# Patient Record
Sex: MALE | Race: WHITE
[De-identification: names, ages, dates, MRNs, and addresses within clinical notes are randomized per-mention and may not be internally consistent; named-entity substitution may affect disease eponyms.]

---

## 2019-11-22 ENCOUNTER — HOSPITAL ENCOUNTER (OUTPATIENT)
Dept: HOSPITAL 8 - RAD | Age: 59
Discharge: HOME | End: 2019-11-22
Attending: INTERNAL MEDICINE
Payer: COMMERCIAL

## 2019-11-22 DIAGNOSIS — N40.0: ICD-10-CM

## 2019-11-22 DIAGNOSIS — K40.20: ICD-10-CM

## 2019-11-22 DIAGNOSIS — M85.88: ICD-10-CM

## 2019-11-22 DIAGNOSIS — K59.09: Primary | ICD-10-CM

## 2019-11-22 DIAGNOSIS — K76.0: ICD-10-CM

## 2019-11-22 DIAGNOSIS — R10.32: ICD-10-CM

## 2019-11-22 DIAGNOSIS — I70.0: ICD-10-CM

## 2019-11-22 DIAGNOSIS — K41.90: ICD-10-CM

## 2019-11-22 DIAGNOSIS — K57.30: ICD-10-CM

## 2019-11-22 PROCEDURE — 74177 CT ABD & PELVIS W/CONTRAST: CPT

## 2024-07-22 ENCOUNTER — HOSPITAL ENCOUNTER (EMERGENCY)
Facility: MEDICAL CENTER | Age: 64
End: 2024-07-22
Attending: EMERGENCY MEDICINE
Payer: COMMERCIAL

## 2024-07-22 VITALS
HEART RATE: 80 BPM | TEMPERATURE: 99 F | HEIGHT: 76 IN | OXYGEN SATURATION: 98 % | DIASTOLIC BLOOD PRESSURE: 70 MMHG | BODY MASS INDEX: 24.96 KG/M2 | RESPIRATION RATE: 16 BRPM | WEIGHT: 205 LBS | SYSTOLIC BLOOD PRESSURE: 138 MMHG

## 2024-07-22 DIAGNOSIS — S91.312A LACERATION OF DORSUM OF FOOT, LEFT, INITIAL ENCOUNTER: ICD-10-CM

## 2024-07-22 PROCEDURE — 700101 HCHG RX REV CODE 250: Performed by: EMERGENCY MEDICINE

## 2024-07-22 PROCEDURE — 90715 TDAP VACCINE 7 YRS/> IM: CPT | Performed by: EMERGENCY MEDICINE

## 2024-07-22 PROCEDURE — 304217 HCHG IRRIGATION SYSTEM

## 2024-07-22 PROCEDURE — 90471 IMMUNIZATION ADMIN: CPT

## 2024-07-22 PROCEDURE — 99284 EMERGENCY DEPT VISIT MOD MDM: CPT

## 2024-07-22 PROCEDURE — 51798 US URINE CAPACITY MEASURE: CPT

## 2024-07-22 PROCEDURE — 303747 HCHG EXTRA SUTURE

## 2024-07-22 PROCEDURE — 700111 HCHG RX REV CODE 636 W/ 250 OVERRIDE (IP): Performed by: EMERGENCY MEDICINE

## 2024-07-22 PROCEDURE — 304999 HCHG REPAIR-SIMPLE/INTERMED LEVEL 1

## 2024-07-22 RX ADMIN — LIDOCAINE HYDROCHLORIDE 10 ML: 10; .005 INJECTION, SOLUTION EPIDURAL; INFILTRATION; INTRACAUDAL; PERINEURAL at 14:15

## 2024-07-22 RX ADMIN — CLOSTRIDIUM TETANI TOXOID ANTIGEN (FORMALDEHYDE INACTIVATED), CORYNEBACTERIUM DIPHTHERIAE TOXOID ANTIGEN (FORMALDEHYDE INACTIVATED), BORDETELLA PERTUSSIS TOXOID ANTIGEN (GLUTARALDEHYDE INACTIVATED), BORDETELLA PERTUSSIS FILAMENTOUS HEMAGGLUTININ ANTIGEN (FORMALDEHYDE INACTIVATED), BORDETELLA PERTUSSIS PERTACTIN ANTIGEN, AND BORDETELLA PERTUSSIS FIMBRIAE 2/3 ANTIGEN 0.5 ML: 5; 2; 2.5; 5; 3; 5 INJECTION, SUSPENSION INTRAMUSCULAR at 14:45

## 2024-08-02 ENCOUNTER — OFFICE VISIT (OUTPATIENT)
Dept: URGENT CARE | Facility: PHYSICIAN GROUP | Age: 64
End: 2024-08-02
Payer: COMMERCIAL

## 2024-08-02 VITALS
TEMPERATURE: 98.7 F | DIASTOLIC BLOOD PRESSURE: 60 MMHG | HEART RATE: 74 BPM | HEIGHT: 76 IN | WEIGHT: 204 LBS | RESPIRATION RATE: 16 BRPM | OXYGEN SATURATION: 94 % | SYSTOLIC BLOOD PRESSURE: 100 MMHG | BODY MASS INDEX: 24.84 KG/M2

## 2024-08-02 DIAGNOSIS — L08.9 INFECTED LACERATION: ICD-10-CM

## 2024-08-02 DIAGNOSIS — T14.8XXA INFECTED LACERATION: ICD-10-CM

## 2024-08-02 DIAGNOSIS — Z48.02 ENCOUNTER FOR REMOVAL OF SUTURES: ICD-10-CM

## 2024-08-02 PROCEDURE — 15853 REMOVAL SUTR/STAPL XREQ ANES: CPT

## 2024-08-02 PROCEDURE — 99203 OFFICE O/P NEW LOW 30 MIN: CPT

## 2024-08-02 PROCEDURE — 3078F DIAST BP <80 MM HG: CPT

## 2024-08-02 PROCEDURE — 3074F SYST BP LT 130 MM HG: CPT

## 2024-08-02 PROCEDURE — 1125F AMNT PAIN NOTED PAIN PRSNT: CPT

## 2024-08-02 RX ORDER — DOXYCYCLINE HYCLATE 100 MG
100 TABLET ORAL 2 TIMES DAILY
Qty: 14 TABLET | Refills: 0 | Status: SHIPPED | OUTPATIENT
Start: 2024-08-02 | End: 2024-08-09

## 2024-08-02 ASSESSMENT — PAIN SCALES - GENERAL: PAINLEVEL: 2=MINIMAL-SLIGHT

## 2024-08-02 ASSESSMENT — ENCOUNTER SYMPTOMS: FEVER: 0

## 2024-08-02 NOTE — PROGRESS NOTES
"Subjective:     CHIEF COMPLAINT  Chief Complaint   Patient presents with    Suture / Staple Removal     Staples got placed 11 days ago        HPI  Oscar Irizarry is a very pleasant 64 y.o. male who presents for staple removal to laceration on his left foot.  He had 15 staples placed on 7/22/2024 in the emergency department.  He reports that since then he has noticed increased redness and drainage from his laceration site.  He reports difficulty washing his laceration site secondary to mobility.  He has otherwise been feeling well and has not had any fevers.      REVIEW OF SYSTEMS  Review of Systems   Constitutional:  Negative for fever.       PAST MEDICAL HISTORY  There are no problems to display for this patient.      SURGICAL HISTORY  patient denies any surgical history    ALLERGIES  No Known Allergies    CURRENT MEDICATIONS  Home Medications       Reviewed by Tonya Daugherty P.A.-C. (Physician Assistant) on 08/02/24 at 1326  Med List Status: <None>     Medication Last Dose Status        Patient Juan Manuel Taking any Medications                           SOCIAL HISTORY  Social History     Tobacco Use    Smoking status: Never    Smokeless tobacco: Never   Vaping Use    Vaping status: Not on file   Substance and Sexual Activity    Alcohol use: Not Currently    Drug use: Not Currently    Sexual activity: Not Currently       FAMILY HISTORY  History reviewed. No pertinent family history.       Objective:     VITAL SIGNS: /60 (BP Location: Right arm, Patient Position: Sitting, BP Cuff Size: Adult)   Pulse 74   Temp 37.1 °C (98.7 °F) (Temporal)   Resp (!) 1   Ht 1.93 m (6' 4\")   Wt 92.5 kg (204 lb)   SpO2 94%   BMI 24.83 kg/m²     PHYSICAL EXAM  Physical Exam  Vitals reviewed.   Constitutional:       General: He is not in acute distress.     Appearance: Normal appearance. He is not ill-appearing or toxic-appearing.   HENT:      Head: Normocephalic and atraumatic.      Mouth/Throat:      Mouth: Mucous " membranes are moist.   Eyes:      Conjunctiva/sclera: Conjunctivae normal.      Pupils: Pupils are equal, round, and reactive to light.   Pulmonary:      Effort: Pulmonary effort is normal. No respiratory distress.   Musculoskeletal:        Feet:    Feet:      Comments: Laceration to lateral surface of left foot and heel with surrounding erythema and purulent/serosanguineous drainage present.  Area is nontender to palpation.  15 staples in place.  5 staples were able to be removed.  Remainder of wound without full healing completed.  Skin:     General: Skin is warm and dry.   Neurological:      General: No focal deficit present.      Mental Status: He is alert and oriented to person, place, and time.   Psychiatric:         Mood and Affect: Mood normal.       Suture Removal    Date/Time: 8/2/2024 1:47 PM    Performed by: Tonya Daugherty P.A.-C.  Authorized by: Tonya Daugherty P.A.-C.  Body area: lower extremity  Location details: left foot  Wound Appearance: red, warm, draining, moist and purulent  Staples Removed: 5  Post-removal: dressing applied  Patient tolerance: patient tolerated the procedure well with no immediate complications  Comments: 15 staples in place.  5 staples able to be removed.  Remainder of wound without appropriate approximation of wound edges and incomplete healing.           Assessment/Plan:     1. Infected laceration  - doxycycline (VIBRAMYCIN) 100 MG Tab; Take 1 Tablet by mouth 2 times a day for 7 days.  Dispense: 14 Tablet; Refill: 0    2. Encounter for removal of sutures    Other orders  - Suture Removal  -Wash area with warm soapy water  -Tylenol over-the-counter as needed for discomfort  -Return to clinic in 3 to 4 days for removal of remaining tendon staples  -Return to clinic sooner if symptoms worsen or fail to resolve    MDM/Comments:  Patient has stable vital signs and is non-toxic appearing.  Patient initiated on doxycycline for treatment of an infected laceration.  5  staples were able to be removed.  Remaining 10 staples were left in place due to incomplete healing likely secondary to infection delaying healing.  Patient has been advised to return to the clinic in 3 to 4 days for removal of remaining staples.  Discussed wound care instructions with patient including washing site with warm soapy water or asking his wife to help him wash the site if mobility is an issue.  Patient is not displaying any systemic symptoms and otherwise appears well at this time.  Discussed supportive care with keeping the wound site covered while walking around, rest, Tylenol as needed. Patient demonstrated understanding of treatment plan at this time and will RTC in 3-4 days for removal of remaining 10 staples.     Differential diagnosis, natural history, supportive care, and indications for immediate follow-up discussed. All questions answered. Patient agrees with the plan of care.    Follow-up as needed if symptoms worsen or fail to improve to PCP, Urgent care or Emergency Room.    I have personally reviewed prior external notes and test results pertinent to today's visit.  I have independently reviewed and interpreted all diagnostics ordered during this urgent care acute visit.   Discussed management options (risks,benefits, and alternatives to treatment). Pt expresses understanding and the treatment plan was agreed upon. Questions were encouraged and answered to pt's satisfaction.    Please note that this dictation was created using voice recognition software. I have made a reasonable attempt to correct obvious errors, but I expect that there are errors of grammar and possibly content that I did not discover before finalizing the note.

## 2024-08-06 ENCOUNTER — OFFICE VISIT (OUTPATIENT)
Dept: URGENT CARE | Facility: PHYSICIAN GROUP | Age: 64
End: 2024-08-06
Payer: COMMERCIAL

## 2024-08-06 VITALS
WEIGHT: 205 LBS | SYSTOLIC BLOOD PRESSURE: 102 MMHG | RESPIRATION RATE: 16 BRPM | HEIGHT: 76 IN | OXYGEN SATURATION: 96 % | TEMPERATURE: 97.1 F | BODY MASS INDEX: 24.96 KG/M2 | HEART RATE: 78 BPM | DIASTOLIC BLOOD PRESSURE: 60 MMHG

## 2024-08-06 DIAGNOSIS — Z48.02 ENCOUNTER FOR REMOVAL OF SUTURES: ICD-10-CM

## 2024-08-06 DIAGNOSIS — S91.312A LACERATION OF LEFT FOOT, INITIAL ENCOUNTER: ICD-10-CM

## 2024-08-06 PROCEDURE — 99212 OFFICE O/P EST SF 10 MIN: CPT | Performed by: NURSE PRACTITIONER

## 2024-08-06 PROCEDURE — 15853 REMOVAL SUTR/STAPL XREQ ANES: CPT | Performed by: NURSE PRACTITIONER

## 2024-08-06 PROCEDURE — 3074F SYST BP LT 130 MM HG: CPT | Performed by: NURSE PRACTITIONER

## 2024-08-06 PROCEDURE — 3078F DIAST BP <80 MM HG: CPT | Performed by: NURSE PRACTITIONER

## 2024-08-06 ASSESSMENT — ENCOUNTER SYMPTOMS
FEVER: 0
CHILLS: 0
SENSORY CHANGE: 0
TINGLING: 0
MYALGIAS: 0

## 2024-08-06 NOTE — PROGRESS NOTES
"Subjective     Oscar Irizarry is a 64 y.o. male who presents with Suture / Staple Removal            Suture / Staple Removal    Oscar has come into urgent care for staple removal.  Seen in the emergency room July 22 of this year for laceration repair to left foot.  15 staples were placed at that time.  Was seen in urgent care 4 days ago due to increased redness and drainage from site with staple removal.  He was placed on antibiotics and 5 staples were removed at that time.  Has been taking antibiotics with no problems.  No drainage from site.    PMH:  has no past medical history on file.  MEDS:   Current Outpatient Medications:     doxycycline (VIBRAMYCIN) 100 MG Tab, Take 1 Tablet by mouth 2 times a day for 7 days., Disp: 14 Tablet, Rfl: 0  ALLERGIES: No Known Allergies  SURGHX: No past surgical history on file.  SOCHX:  reports that he has never smoked. He has never used smokeless tobacco. He reports that he does not currently use alcohol. He reports that he does not currently use drugs.  FH: Family history was reviewed, no pertinent findings to report    Review of Systems   Constitutional:  Negative for chills, fever and malaise/fatigue.   Cardiovascular:  Negative for leg swelling.   Musculoskeletal:  Negative for joint pain and myalgias.   Skin:  Negative for itching and rash.        Staple removal from left foot.   Neurological:  Negative for tingling and sensory change.   All other systems reviewed and are negative.             Objective     /60 (BP Location: Left arm, Patient Position: Sitting, BP Cuff Size: Adult)   Pulse 78   Temp 36.2 °C (97.1 °F) (Temporal)   Resp 16   Ht 1.93 m (6' 4\")   Wt 93 kg (205 lb) Comment: per pt  SpO2 96%   BMI 24.95 kg/m²      Physical Exam  Vitals reviewed.   Constitutional:       General: He is awake. He is not in acute distress.     Appearance: Normal appearance. He is not ill-appearing, toxic-appearing or diaphoretic.   Cardiovascular:      Rate and " Rhythm: Normal rate.   Pulmonary:      Effort: Pulmonary effort is normal.   Musculoskeletal:      Left foot: Normal range of motion. No deformity, bunion, Charcot foot, foot drop or prominent metatarsal heads.        Feet:    Feet:      Left foot:      Skin integrity: No skin breakdown, erythema or warmth.   Skin:     General: Skin is warm and dry.      Findings: Laceration present. No erythema.      Comments: Ten intact staples to lateral aspect of left heel.  No erythema, swelling or drainage from site.  Removed remaining staples from foot.  Skin has granulated with healing but no full approximation after removal.  I have applied Dermabond as well as 1/2 inch Steri-Strips to site.  Medical assistant applied Telfa and Tegaderm bandage.   Neurological:      Mental Status: He is alert.   Psychiatric:         Behavior: Behavior is cooperative.                             Assessment & Plan        1. Encounter for removal of sutures      2. Laceration of left foot, initial encounter      -May use over the counter ibuprofen or Tylenol as needed for discomfort  -Keep bandage clean and dry, remove and change daily, if soiled or wet remove and replace  -Mild soap and water, do not scrub, pat dry as needed-avoid soap on Steri-Strips as this may cause them to prematurely come off  -Avoid use of triple antibiotic ointment with Steri-Strips  -May use ice for any swelling or discomfort  -Monitor for increased swelling, redness and increased heat to site, discharge, fever, red streaking- need re-evaluation in urgent care  Return as needed

## 2024-11-22 ENCOUNTER — HOSPITAL ENCOUNTER (OUTPATIENT)
Dept: CARDIOLOGY | Facility: MEDICAL CENTER | Age: 64
End: 2024-11-22
Attending: STUDENT IN AN ORGANIZED HEALTH CARE EDUCATION/TRAINING PROGRAM
Payer: COMMERCIAL

## 2024-11-22 ENCOUNTER — HOSPITAL ENCOUNTER (OUTPATIENT)
Dept: LAB | Facility: MEDICAL CENTER | Age: 64
End: 2024-11-22
Attending: STUDENT IN AN ORGANIZED HEALTH CARE EDUCATION/TRAINING PROGRAM
Payer: COMMERCIAL

## 2024-11-22 DIAGNOSIS — R29.90 STROKE-LIKE SYMPTOMS: ICD-10-CM

## 2024-11-22 LAB
ALBUMIN SERPL BCP-MCNC: 4.4 G/DL (ref 3.2–4.9)
ALBUMIN/GLOB SERPL: 1.8 G/DL
ALP SERPL-CCNC: 70 U/L (ref 30–99)
ALT SERPL-CCNC: 15 U/L (ref 2–50)
ANION GAP SERPL CALC-SCNC: 14 MMOL/L (ref 7–16)
AST SERPL-CCNC: 16 U/L (ref 12–45)
BASOPHILS # BLD AUTO: 0.6 % (ref 0–1.8)
BASOPHILS # BLD: 0.04 K/UL (ref 0–0.12)
BILIRUB SERPL-MCNC: 0.4 MG/DL (ref 0.1–1.5)
BUN SERPL-MCNC: 17 MG/DL (ref 8–22)
CALCIUM ALBUM COR SERPL-MCNC: 8.8 MG/DL (ref 8.5–10.5)
CALCIUM SERPL-MCNC: 9.1 MG/DL (ref 8.5–10.5)
CHLORIDE SERPL-SCNC: 104 MMOL/L (ref 96–112)
CHOLEST SERPL-MCNC: 216 MG/DL (ref 100–199)
CO2 SERPL-SCNC: 23 MMOL/L (ref 20–33)
CREAT SERPL-MCNC: 0.83 MG/DL (ref 0.5–1.4)
EOSINOPHIL # BLD AUTO: 0.07 K/UL (ref 0–0.51)
EOSINOPHIL NFR BLD: 1.1 % (ref 0–6.9)
ERYTHROCYTE [DISTWIDTH] IN BLOOD BY AUTOMATED COUNT: 48.5 FL (ref 35.9–50)
EST. AVERAGE GLUCOSE BLD GHB EST-MCNC: 105 MG/DL
FOLATE SERPL-MCNC: 14.7 NG/ML
GFR SERPLBLD CREATININE-BSD FMLA CKD-EPI: 96 ML/MIN/1.73 M 2
GLOBULIN SER CALC-MCNC: 2.4 G/DL (ref 1.9–3.5)
GLUCOSE SERPL-MCNC: 117 MG/DL (ref 65–99)
HBA1C MFR BLD: 5.3 % (ref 4–5.6)
HCT VFR BLD AUTO: 39.9 % (ref 42–52)
HDLC SERPL-MCNC: 53 MG/DL
HGB BLD-MCNC: 13.1 G/DL (ref 14–18)
IMM GRANULOCYTES # BLD AUTO: 0.02 K/UL (ref 0–0.11)
IMM GRANULOCYTES NFR BLD AUTO: 0.3 % (ref 0–0.9)
LDLC SERPL CALC-MCNC: 142 MG/DL
LYMPHOCYTES # BLD AUTO: 1.43 K/UL (ref 1–4.8)
LYMPHOCYTES NFR BLD: 22 % (ref 22–41)
MCH RBC QN AUTO: 32 PG (ref 27–33)
MCHC RBC AUTO-ENTMCNC: 32.8 G/DL (ref 32.3–36.5)
MCV RBC AUTO: 97.6 FL (ref 81.4–97.8)
MONOCYTES # BLD AUTO: 0.35 K/UL (ref 0–0.85)
MONOCYTES NFR BLD AUTO: 5.4 % (ref 0–13.4)
NEUTROPHILS # BLD AUTO: 4.59 K/UL (ref 1.82–7.42)
NEUTROPHILS NFR BLD: 70.6 % (ref 44–72)
NRBC # BLD AUTO: 0 K/UL
NRBC BLD-RTO: 0 /100 WBC (ref 0–0.2)
PLATELET # BLD AUTO: 259 K/UL (ref 164–446)
PMV BLD AUTO: 9.9 FL (ref 9–12.9)
POTASSIUM SERPL-SCNC: 4 MMOL/L (ref 3.6–5.5)
PROT SERPL-MCNC: 6.8 G/DL (ref 6–8.2)
RBC # BLD AUTO: 4.09 M/UL (ref 4.7–6.1)
SODIUM SERPL-SCNC: 141 MMOL/L (ref 135–145)
TRIGL SERPL-MCNC: 104 MG/DL (ref 0–149)
TSH SERPL DL<=0.005 MIU/L-ACNC: 1.34 UIU/ML (ref 0.38–5.33)
VIT B12 SERPL-MCNC: 521 PG/ML (ref 211–911)
WBC # BLD AUTO: 6.5 K/UL (ref 4.8–10.8)

## 2024-11-22 PROCEDURE — 84443 ASSAY THYROID STIM HORMONE: CPT

## 2024-11-22 PROCEDURE — 82607 VITAMIN B-12: CPT

## 2024-11-22 PROCEDURE — 80053 COMPREHEN METABOLIC PANEL: CPT

## 2024-11-22 PROCEDURE — 80061 LIPID PANEL: CPT

## 2024-11-22 PROCEDURE — 36415 COLL VENOUS BLD VENIPUNCTURE: CPT

## 2024-11-22 PROCEDURE — 83036 HEMOGLOBIN GLYCOSYLATED A1C: CPT

## 2024-11-22 PROCEDURE — 93306 TTE W/DOPPLER COMPLETE: CPT

## 2024-11-22 PROCEDURE — 82746 ASSAY OF FOLIC ACID SERUM: CPT

## 2024-11-22 PROCEDURE — 85025 COMPLETE CBC W/AUTO DIFF WBC: CPT

## 2024-11-23 LAB
LV EJECT FRACT  99904: 65
LV EJECT FRACT MOD 2C 99903: 68.75
LV EJECT FRACT MOD 4C 99902: 60.29
LV EJECT FRACT MOD BP 99901: 65.03

## 2024-11-23 PROCEDURE — 93306 TTE W/DOPPLER COMPLETE: CPT | Mod: 26 | Performed by: INTERNAL MEDICINE

## 2024-12-07 ENCOUNTER — HOSPITAL ENCOUNTER (OUTPATIENT)
Dept: RADIOLOGY | Facility: MEDICAL CENTER | Age: 64
End: 2024-12-07
Attending: STUDENT IN AN ORGANIZED HEALTH CARE EDUCATION/TRAINING PROGRAM
Payer: COMMERCIAL

## 2024-12-07 DIAGNOSIS — H53.122 TRANSIENT VISUAL LOSS OF LEFT EYE: ICD-10-CM

## 2024-12-07 PROCEDURE — 700117 HCHG RX CONTRAST REV CODE 255: Mod: JZ | Performed by: STUDENT IN AN ORGANIZED HEALTH CARE EDUCATION/TRAINING PROGRAM

## 2024-12-07 PROCEDURE — A9579 GAD-BASE MR CONTRAST NOS,1ML: HCPCS | Mod: JZ | Performed by: STUDENT IN AN ORGANIZED HEALTH CARE EDUCATION/TRAINING PROGRAM

## 2024-12-07 PROCEDURE — 70553 MRI BRAIN STEM W/O & W/DYE: CPT

## 2024-12-07 RX ADMIN — GADOTERIDOL 19 ML: 279.3 INJECTION, SOLUTION INTRAVENOUS at 15:09

## 2024-12-25 ENCOUNTER — HOSPITAL ENCOUNTER (OUTPATIENT)
Facility: MEDICAL CENTER | Age: 64
End: 2024-12-29
Attending: EMERGENCY MEDICINE | Admitting: INTERNAL MEDICINE
Payer: MEDICARE

## 2024-12-25 ENCOUNTER — APPOINTMENT (OUTPATIENT)
Dept: RADIOLOGY | Facility: MEDICAL CENTER | Age: 64
End: 2024-12-25
Attending: EMERGENCY MEDICINE
Payer: MEDICARE

## 2024-12-25 DIAGNOSIS — H54.40 BLINDNESS OF LEFT EYE, UNSPECIFIED RIGHT EYE VISUAL IMPAIRMENT CATEGORY: ICD-10-CM

## 2024-12-25 DIAGNOSIS — K59.00 CONSTIPATION, UNSPECIFIED CONSTIPATION TYPE: ICD-10-CM

## 2024-12-25 DIAGNOSIS — F80.9 SPEECH AND LANGUAGE DEFICITS: ICD-10-CM

## 2024-12-25 DIAGNOSIS — R29.898 WEAKNESS OF BOTH LOWER EXTREMITIES: ICD-10-CM

## 2024-12-25 DIAGNOSIS — R27.0 ATAXIA: ICD-10-CM

## 2024-12-25 DIAGNOSIS — H35.3220 EXUDATIVE AGE-RELATED MACULAR DEGENERATION OF LEFT EYE, UNSPECIFIED STAGE (HCC): ICD-10-CM

## 2024-12-25 DIAGNOSIS — Z78.9 UNABLE TO CARE FOR SELF: ICD-10-CM

## 2024-12-25 PROBLEM — R74.01 ELEVATED SGOT (AST): Status: ACTIVE | Noted: 2024-12-25

## 2024-12-25 LAB
ALBUMIN SERPL BCP-MCNC: 4.2 G/DL (ref 3.2–4.9)
ALBUMIN/GLOB SERPL: 1.6 G/DL
ALP SERPL-CCNC: 99 U/L (ref 30–99)
ALT SERPL-CCNC: 32 U/L (ref 2–50)
ANION GAP SERPL CALC-SCNC: 13 MMOL/L (ref 7–16)
APPEARANCE UR: CLEAR
AST SERPL-CCNC: 68 U/L (ref 12–45)
BACTERIA #/AREA URNS HPF: ABNORMAL /HPF
BASOPHILS # BLD AUTO: 0.7 % (ref 0–1.8)
BASOPHILS # BLD: 0.05 K/UL (ref 0–0.12)
BILIRUB SERPL-MCNC: 0.5 MG/DL (ref 0.1–1.5)
BILIRUB UR QL STRIP.AUTO: NEGATIVE
BUN SERPL-MCNC: 17 MG/DL (ref 8–22)
CALCIUM ALBUM COR SERPL-MCNC: 9.1 MG/DL (ref 8.5–10.5)
CALCIUM SERPL-MCNC: 9.3 MG/DL (ref 8.5–10.5)
CASTS URNS QL MICRO: ABNORMAL /LPF (ref 0–2)
CHLORIDE SERPL-SCNC: 103 MMOL/L (ref 96–112)
CK SERPL-CCNC: 904 U/L (ref 0–154)
CO2 SERPL-SCNC: 21 MMOL/L (ref 20–33)
COLOR UR: ABNORMAL
CREAT SERPL-MCNC: 1.01 MG/DL (ref 0.5–1.4)
DEPRECATED # ENTITIES SPRM: PRESENT /HPF
EOSINOPHIL # BLD AUTO: 0.1 K/UL (ref 0–0.51)
EOSINOPHIL NFR BLD: 1.4 % (ref 0–6.9)
EPITHELIAL CELLS 1715: ABNORMAL /HPF (ref 0–5)
ERYTHROCYTE [DISTWIDTH] IN BLOOD BY AUTOMATED COUNT: 44.8 FL (ref 35.9–50)
GFR SERPLBLD CREATININE-BSD FMLA CKD-EPI: 82 ML/MIN/1.73 M 2
GLOBULIN SER CALC-MCNC: 2.7 G/DL (ref 1.9–3.5)
GLUCOSE SERPL-MCNC: 99 MG/DL (ref 65–99)
GLUCOSE UR STRIP.AUTO-MCNC: NEGATIVE MG/DL
GRANULAR CASTS  1716G: PRESENT /LPF
HCT VFR BLD AUTO: 36.4 % (ref 42–52)
HGB BLD-MCNC: 12.6 G/DL (ref 14–18)
HYALINE CAST   1831: PRESENT /LPF
IMM GRANULOCYTES # BLD AUTO: 0.01 K/UL (ref 0–0.11)
IMM GRANULOCYTES NFR BLD AUTO: 0.1 % (ref 0–0.9)
KETONES UR STRIP.AUTO-MCNC: 15 MG/DL
LEUKOCYTE ESTERASE UR QL STRIP.AUTO: NEGATIVE
LIPASE SERPL-CCNC: 50 U/L (ref 11–82)
LYMPHOCYTES # BLD AUTO: 1.32 K/UL (ref 1–4.8)
LYMPHOCYTES NFR BLD: 19.1 % (ref 22–41)
MAGNESIUM SERPL-MCNC: 2.2 MG/DL (ref 1.5–2.5)
MCH RBC QN AUTO: 32.9 PG (ref 27–33)
MCHC RBC AUTO-ENTMCNC: 34.6 G/DL (ref 32.3–36.5)
MCV RBC AUTO: 95 FL (ref 81.4–97.8)
MICRO URNS: ABNORMAL
MONOCYTES # BLD AUTO: 0.55 K/UL (ref 0–0.85)
MONOCYTES NFR BLD AUTO: 8 % (ref 0–13.4)
MUCOUS THREADS URNS QL MICRO: PRESENT /HPF
NEUTROPHILS # BLD AUTO: 4.87 K/UL (ref 1.82–7.42)
NEUTROPHILS NFR BLD: 70.7 % (ref 44–72)
NITRITE UR QL STRIP.AUTO: NEGATIVE
NRBC # BLD AUTO: 0 K/UL
NRBC BLD-RTO: 0 /100 WBC (ref 0–0.2)
PH UR STRIP.AUTO: 5 [PH] (ref 5–8)
PHOSPHATE SERPL-MCNC: 2.7 MG/DL (ref 2.5–4.5)
PLATELET # BLD AUTO: 207 K/UL (ref 164–446)
PMV BLD AUTO: 9.9 FL (ref 9–12.9)
POTASSIUM SERPL-SCNC: 3.8 MMOL/L (ref 3.6–5.5)
PROT SERPL-MCNC: 6.9 G/DL (ref 6–8.2)
PROT UR QL STRIP: 30 MG/DL
RBC # BLD AUTO: 3.83 M/UL (ref 4.7–6.1)
RBC # URNS HPF: ABNORMAL /HPF (ref 0–2)
RBC UR QL AUTO: NEGATIVE
SODIUM SERPL-SCNC: 137 MMOL/L (ref 135–145)
SP GR UR STRIP.AUTO: 1.03
TSH SERPL DL<=0.005 MIU/L-ACNC: 1.51 UIU/ML (ref 0.38–5.33)
UROBILINOGEN UR STRIP.AUTO-MCNC: 1 EU/DL
VIT B12 SERPL-MCNC: 624 PG/ML (ref 211–911)
VIT B12 SERPL-MCNC: 626 PG/ML (ref 211–911)
WBC # BLD AUTO: 6.9 K/UL (ref 4.8–10.8)
WBC #/AREA URNS HPF: ABNORMAL /HPF

## 2024-12-25 PROCEDURE — 74018 RADEX ABDOMEN 1 VIEW: CPT

## 2024-12-25 PROCEDURE — 81001 URINALYSIS AUTO W/SCOPE: CPT

## 2024-12-25 PROCEDURE — 83690 ASSAY OF LIPASE: CPT

## 2024-12-25 PROCEDURE — 99221 1ST HOSP IP/OBS SF/LOW 40: CPT | Performed by: INTERNAL MEDICINE

## 2024-12-25 PROCEDURE — A9270 NON-COVERED ITEM OR SERVICE: HCPCS | Performed by: EMERGENCY MEDICINE

## 2024-12-25 PROCEDURE — 82550 ASSAY OF CK (CPK): CPT

## 2024-12-25 PROCEDURE — 82607 VITAMIN B-12: CPT

## 2024-12-25 PROCEDURE — 84100 ASSAY OF PHOSPHORUS: CPT

## 2024-12-25 PROCEDURE — 36415 COLL VENOUS BLD VENIPUNCTURE: CPT

## 2024-12-25 PROCEDURE — 85025 COMPLETE CBC W/AUTO DIFF WBC: CPT

## 2024-12-25 PROCEDURE — G0378 HOSPITAL OBSERVATION PER HR: HCPCS

## 2024-12-25 PROCEDURE — 84425 ASSAY OF VITAMIN B-1: CPT

## 2024-12-25 PROCEDURE — 700102 HCHG RX REV CODE 250 W/ 637 OVERRIDE(OP): Performed by: EMERGENCY MEDICINE

## 2024-12-25 PROCEDURE — A9270 NON-COVERED ITEM OR SERVICE: HCPCS | Performed by: INTERNAL MEDICINE

## 2024-12-25 PROCEDURE — 99285 EMERGENCY DEPT VISIT HI MDM: CPT

## 2024-12-25 PROCEDURE — 84443 ASSAY THYROID STIM HORMONE: CPT

## 2024-12-25 PROCEDURE — 83735 ASSAY OF MAGNESIUM: CPT

## 2024-12-25 PROCEDURE — 700102 HCHG RX REV CODE 250 W/ 637 OVERRIDE(OP): Performed by: INTERNAL MEDICINE

## 2024-12-25 PROCEDURE — 80053 COMPREHEN METABOLIC PANEL: CPT

## 2024-12-25 RX ORDER — ACETAMINOPHEN 325 MG/1
650 TABLET ORAL EVERY 6 HOURS PRN
Status: DISCONTINUED | OUTPATIENT
Start: 2024-12-25 | End: 2024-12-29 | Stop reason: HOSPADM

## 2024-12-25 RX ORDER — BISACODYL 5 MG
10 TABLET, DELAYED RELEASE (ENTERIC COATED) ORAL ONCE
Status: COMPLETED | OUTPATIENT
Start: 2024-12-25 | End: 2024-12-25

## 2024-12-25 RX ORDER — AMOXICILLIN 250 MG
2 CAPSULE ORAL EVERY EVENING
Status: DISCONTINUED | OUTPATIENT
Start: 2024-12-25 | End: 2024-12-26

## 2024-12-25 RX ORDER — ENOXAPARIN SODIUM 100 MG/ML
40 INJECTION SUBCUTANEOUS DAILY
Status: DISCONTINUED | OUTPATIENT
Start: 2024-12-25 | End: 2024-12-29 | Stop reason: HOSPADM

## 2024-12-25 RX ORDER — LABETALOL HYDROCHLORIDE 5 MG/ML
10 INJECTION, SOLUTION INTRAVENOUS EVERY 4 HOURS PRN
Status: DISCONTINUED | OUTPATIENT
Start: 2024-12-25 | End: 2024-12-29 | Stop reason: HOSPADM

## 2024-12-25 RX ORDER — ONDANSETRON 4 MG/1
4 TABLET, ORALLY DISINTEGRATING ORAL EVERY 4 HOURS PRN
Status: DISCONTINUED | OUTPATIENT
Start: 2024-12-25 | End: 2024-12-29 | Stop reason: HOSPADM

## 2024-12-25 RX ORDER — ONDANSETRON 2 MG/ML
4 INJECTION INTRAMUSCULAR; INTRAVENOUS EVERY 4 HOURS PRN
Status: DISCONTINUED | OUTPATIENT
Start: 2024-12-25 | End: 2024-12-29 | Stop reason: HOSPADM

## 2024-12-25 RX ORDER — POLYETHYLENE GLYCOL 3350 17 G/17G
1 POWDER, FOR SOLUTION ORAL
Status: DISCONTINUED | OUTPATIENT
Start: 2024-12-25 | End: 2024-12-26

## 2024-12-25 RX ORDER — POLYETHYLENE GLYCOL 3350 17 G/17G
1 POWDER, FOR SOLUTION ORAL 2 TIMES DAILY
Status: DISCONTINUED | OUTPATIENT
Start: 2024-12-25 | End: 2024-12-26

## 2024-12-25 RX ADMIN — BISACODYL 10 MG: 5 TABLET, COATED ORAL at 15:12

## 2024-12-25 RX ADMIN — MAJOR MAGNESIUM CITRATE ORAL SOLUTION - LEMON 296 ML: 1.75 LIQUID ORAL at 15:12

## 2024-12-25 RX ADMIN — POLYETHYLENE GLYCOL 3350 1 PACKET: 17 POWDER, FOR SOLUTION ORAL at 18:15

## 2024-12-25 SDOH — ECONOMIC STABILITY: TRANSPORTATION INSECURITY
IN THE PAST 12 MONTHS, HAS LACK OF RELIABLE TRANSPORTATION KEPT YOU FROM MEDICAL APPOINTMENTS, MEETINGS, WORK OR FROM GETTING THINGS NEEDED FOR DAILY LIVING?: NO

## 2024-12-25 SDOH — ECONOMIC STABILITY: TRANSPORTATION INSECURITY
IN THE PAST 12 MONTHS, HAS THE LACK OF TRANSPORTATION KEPT YOU FROM MEDICAL APPOINTMENTS OR FROM GETTING MEDICATIONS?: NO

## 2024-12-25 ASSESSMENT — LIFESTYLE VARIABLES
HAVE PEOPLE ANNOYED YOU BY CRITICIZING YOUR DRINKING: NO
HAVE YOU EVER FELT YOU SHOULD CUT DOWN ON YOUR DRINKING: NO
CONSUMPTION TOTAL: NEGATIVE
EVER HAD A DRINK FIRST THING IN THE MORNING TO STEADY YOUR NERVES TO GET RID OF A HANGOVER: NO
SUBSTANCE_ABUSE: 0
ON A TYPICAL DAY WHEN YOU DRINK ALCOHOL HOW MANY DRINKS DO YOU HAVE: 0
TOTAL SCORE: 0
HOW MANY TIMES IN THE PAST YEAR HAVE YOU HAD 5 OR MORE DRINKS IN A DAY: 0
TOTAL SCORE: 0
TOTAL SCORE: 0
EVER FELT BAD OR GUILTY ABOUT YOUR DRINKING: NO
ALCOHOL_USE: NO
AVERAGE NUMBER OF DAYS PER WEEK YOU HAVE A DRINK CONTAINING ALCOHOL: 0

## 2024-12-25 ASSESSMENT — ENCOUNTER SYMPTOMS
NECK PAIN: 0
SPUTUM PRODUCTION: 0
HEMOPTYSIS: 0
DOUBLE VISION: 0
NERVOUS/ANXIOUS: 0
TREMORS: 0
ORTHOPNEA: 0
PALPITATIONS: 0
WEIGHT LOSS: 0
POLYDIPSIA: 0
CHILLS: 0
CONSTIPATION: 1
FALLS: 1
BLURRED VISION: 0
FLANK PAIN: 0
BRUISES/BLEEDS EASILY: 0
BACK PAIN: 0
PHOTOPHOBIA: 0
NAUSEA: 0
HALLUCINATIONS: 0
HEARTBURN: 0
VOMITING: 0
FOCAL WEAKNESS: 0
HEADACHES: 0
SPEECH CHANGE: 0
COUGH: 0
FEVER: 0

## 2024-12-25 ASSESSMENT — COGNITIVE AND FUNCTIONAL STATUS - GENERAL
WALKING IN HOSPITAL ROOM: A LOT
SUGGESTED CMS G CODE MODIFIER DAILY ACTIVITY: CJ
SUGGESTED CMS G CODE MODIFIER MOBILITY: CK
DAILY ACTIVITIY SCORE: 20
DRESSING REGULAR LOWER BODY CLOTHING: A LITTLE
TURNING FROM BACK TO SIDE WHILE IN FLAT BAD: A LITTLE
MOVING FROM LYING ON BACK TO SITTING ON SIDE OF FLAT BED: A LITTLE
MOVING TO AND FROM BED TO CHAIR: A LITTLE
MOBILITY SCORE: 15
HELP NEEDED FOR BATHING: A LITTLE
TOILETING: A LITTLE
STANDING UP FROM CHAIR USING ARMS: A LOT
DRESSING REGULAR UPPER BODY CLOTHING: A LITTLE
CLIMB 3 TO 5 STEPS WITH RAILING: A LOT

## 2024-12-25 ASSESSMENT — SOCIAL DETERMINANTS OF HEALTH (SDOH)
WITHIN THE LAST YEAR, HAVE YOU BEEN KICKED, HIT, SLAPPED, OR OTHERWISE PHYSICALLY HURT BY YOUR PARTNER OR EX-PARTNER?: NO
IN THE PAST 12 MONTHS, HAS THE ELECTRIC, GAS, OIL, OR WATER COMPANY THREATENED TO SHUT OFF SERVICE IN YOUR HOME?: NO
WITHIN THE LAST YEAR, HAVE YOU BEEN AFRAID OF YOUR PARTNER OR EX-PARTNER?: NO
WITHIN THE PAST 12 MONTHS, YOU WORRIED THAT YOUR FOOD WOULD RUN OUT BEFORE YOU GOT THE MONEY TO BUY MORE: NEVER TRUE
WITHIN THE LAST YEAR, HAVE TO BEEN RAPED OR FORCED TO HAVE ANY KIND OF SEXUAL ACTIVITY BY YOUR PARTNER OR EX-PARTNER?: NO
WITHIN THE PAST 12 MONTHS, THE FOOD YOU BOUGHT JUST DIDN'T LAST AND YOU DIDN'T HAVE MONEY TO GET MORE: NEVER TRUE
WITHIN THE LAST YEAR, HAVE YOU BEEN HUMILIATED OR EMOTIONALLY ABUSED IN OTHER WAYS BY YOUR PARTNER OR EX-PARTNER?: NO

## 2024-12-25 ASSESSMENT — PATIENT HEALTH QUESTIONNAIRE - PHQ9
1. LITTLE INTEREST OR PLEASURE IN DOING THINGS: NOT AT ALL
2. FEELING DOWN, DEPRESSED, IRRITABLE, OR HOPELESS: NOT AT ALL
SUM OF ALL RESPONSES TO PHQ9 QUESTIONS 1 AND 2: 0

## 2024-12-25 ASSESSMENT — PAIN DESCRIPTION - PAIN TYPE
TYPE: ACUTE PAIN
TYPE: ACUTE PAIN

## 2024-12-25 ASSESSMENT — FIBROSIS 4 INDEX: FIB4 SCORE: 1.05

## 2024-12-25 NOTE — ED NOTES
Assumed pt care as break RN. SW at bedside. Call light within reach, fall precautions in place, needs met.

## 2024-12-25 NOTE — ED NOTES
Pt brought back to [facility] 25 vis w.c by request. Pt able to transfer from w/c to [facility] by self. Connected to monitor. Safety reviewed call light within reach.

## 2024-12-25 NOTE — ED TRIAGE NOTES
"Chief Complaint   Patient presents with    Constipation     Last BM x5 days ago, has not taken OTC constipation meds, lower abd pain 8/10, can't ambulate d/t weakness, able to urinate, states not diagnosed with any GI issues but constipation has happened before x2 weeks ago        wc to triage with friend for above complaint.     ABD pain protocols ordered. Pt brought to [facility] office for blood draw. UA given. Pt educated of triage process and possible wait times. Pt informed to contact staff if status changes or with any developing concerns, pt returned to lobby.     /82   Pulse 79   Temp 36.4 °C (97.5 °F) (Temporal)   Resp 16   Ht 1.854 m (6' 1\")   Wt 81.6 kg (180 lb)   SpO2 96%   BMI 23.75 kg/m²      "

## 2024-12-25 NOTE — ED NOTES
Pt called back from lobby. States does not want to go back to room yet as he's waiting for his friend. Attempted to explain that we can let pt come back to the room if he comes back. Pt states friend has his wallet and he doesn't want to leave lobby until his friend is back with wallet.

## 2024-12-25 NOTE — ED PROVIDER NOTES
ER Provider Note    Scribed for Dr. Monie Fallon D.O. by Ada Gallegos. 12/25/2024  2:11 PM    Primary Care Provider: None noted    CHIEF COMPLAINT  Chief Complaint   Patient presents with    Constipation     Last BM x5 days ago, has not taken OTC constipation meds, lower abd pain 8/10, can't ambulate d/t weakness, able to urinate, states not diagnosed with any GI issues but constipation has happened before x2 weeks ago     EXTERNAL RECORDS REVIEWED  Outpatient labs & studies MRI brain done on 12/7/24:   1.  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 Associated somewhat hypoplastic right cerebellar hemisphere.   2.  Mild cerebral atrophy.   3.  Mild supratentorial white matter disease most consistent with microvascular ischemic change.   4.  Minimal pontine ischemic gliosis.   5.  No evidence of acute infarction, hemorrhage, or enhancing mass lesion.   6.  Markedly abnormal left globe demonstrating a retinal detachment or choroidal detachment.     HPI/ROS  LIMITATION TO HISTORY   Select: : None    OUTSIDE HISTORIAN(S):  None    Oscar Irizarry is a 64 y.o. male who presents to the ED for constipation onset 5 days ago. Patient reports associated lower abdominal pain rating it an 8/10 in severity. He states he has not taken any OTC to alleviate this. Patient reports 2 years ago, he had a trip and fell backwards. He had an xray which demonstrated left femur fracture, which did not require surgery. After this, he was able to move himself around and weaned himself off crutches. He ended up retiring shortly after this, however notes his weakness returned and progressed. He fell again about 6 months ago. He adds, that he was seen at ED in July for a foot laceration, for which he obtained 15 staples for. 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  Patient reports he takes prune juice for his constipation,  "however this time he has been unable to get to the restroom and has been holding his stool for some time. He reports his neighbor usually helps him and  now he is planning on moving to [facility] to be closer to his daughter. He states he lives with his wife, who is bed ridden and on oxygen. Patient states he has an onset of slurred speech, which he woke up with 6 weeks ago. He had an MRI done on 12/7 and has been following with his PCP. Patient has an appointment with Neurology end of January.    PAST MEDICAL HISTORY  History reviewed. No pertinent past medical history.    SURGICAL HISTORY  History reviewed. No pertinent surgical history.    FAMILY HISTORY  History reviewed. No pertinent family history.    SOCIAL HISTORY   reports that he has never smoked. He has never used smokeless tobacco. He reports current alcohol use. He reports that he does not currently use drugs.    CURRENT MEDICATIONS  No current outpatient medications    ALLERGIES  Patient has no known allergies.    PHYSICAL EXAM  BP (!) 189/84   Pulse 68   Temp 36.4 °C (97.5 °F) (Temporal)   Resp 16   Ht 1.854 m (6' 1\")   Wt 81.6 kg (180 lb)   SpO2 98%   BMI 23.75 kg/m²   Constitutional: Patient is well developed, well nourished.  Mild  HENT: Normocephalic, atraumatic.  Moist oral mucosa, garbled speech    Cardiovascular: Normal heart rate and Regular rhythm. No murmur  Thorax & Lungs: Clear and equal breath sounds with good excursion. No respiratory distress, no rhonchi, wheezing or rales.  Abdomen: Hyperactive sounds normal in all four quadrants. Soft,nontender, no rebound , guarding, palpable masses.  Mild generalized tenderness  Skin: Warm, Dry   Extremities: Peripheral pulses 4/4 No edema, No tenderness    Neurologic: Alert & oriented x 3, Normal motor function, Normal sensory function, No lateralizing or focal deficits noted. lower extremity weakness bilaterally, equal  strength , garbled speech.  Psychiatric: Affect normal, Judgment normal, " Mood normal.     DIAGNOSTIC STUDIES & PROCEDURES  Labs:   Results for orders placed or performed during the hospital encounter of 12/25/24   CBC WITH DIFFERENTIAL    Collection Time: 12/25/24  1:05 PM   Result Value Ref Range    WBC 6.9 4.8 - 10.8 K/uL    RBC 3.83 (L) 4.70 - 6.10 M/uL    Hemoglobin 12.6 (L) 14.0 - 18.0 g/dL    Hematocrit 36.4 (L) 42.0 - 52.0 %    MCV 95.0 81.4 - 97.8 fL    MCH 32.9 27.0 - 33.0 pg    MCHC 34.6 32.3 - 36.5 g/dL    RDW 44.8 35.9 - 50.0 fL    Platelet Count 207 164 - 446 K/uL    MPV 9.9 9.0 - 12.9 fL    Neutrophils-Polys 70.70 44.00 - 72.00 %    Lymphocytes 19.10 (L) 22.00 - 41.00 %    Monocytes 8.00 0.00 - 13.40 %    Eosinophils 1.40 0.00 - 6.90 %    Basophils 0.70 0.00 - 1.80 %    Immature Granulocytes 0.10 0.00 - 0.90 %    Nucleated RBC 0.00 0.00 - 0.20 /100 WBC    Neutrophils (Absolute) 4.87 1.82 - 7.42 K/uL    Lymphs (Absolute) 1.32 1.00 - 4.80 K/uL    Monos (Absolute) 0.55 0.00 - 0.85 K/uL    Eos (Absolute) 0.10 0.00 - 0.51 K/uL    Baso (Absolute) 0.05 0.00 - 0.12 K/uL    Immature Granulocytes (abs) 0.01 0.00 - 0.11 K/uL    NRBC (Absolute) 0.00 K/uL   COMP METABOLIC PANEL    Collection Time: 12/25/24  1:05 PM   Result Value Ref Range    Sodium 137 135 - 145 mmol/L    Potassium 3.8 3.6 - 5.5 mmol/L    Chloride 103 96 - 112 mmol/L    Co2 21 20 - 33 mmol/L    Anion Gap 13.0 7.0 - 16.0    Glucose 99 65 - 99 mg/dL    Bun 17 8 - 22 mg/dL    Creatinine 1.01 0.50 - 1.40 mg/dL    Calcium 9.3 8.5 - 10.5 mg/dL    Correct Calcium 9.1 8.5 - 10.5 mg/dL    AST(SGOT) 68 (H) 12 - 45 U/L    ALT(SGPT) 32 2 - 50 U/L    Alkaline Phosphatase 99 30 - 99 U/L    Total Bilirubin 0.5 0.1 - 1.5 mg/dL    Albumin 4.2 3.2 - 4.9 g/dL    Total Protein 6.9 6.0 - 8.2 g/dL    Globulin 2.7 1.9 - 3.5 g/dL    A-G Ratio 1.6 g/dL   LIPASE    Collection Time: 12/25/24  1:05 PM   Result Value Ref Range    Lipase 50 11 - 82 U/L   ESTIMATED GFR    Collection Time: 12/25/24  1:05 PM   Result Value Ref Range    GFR (CKD-EPI)  82 >60 mL/min/1.73 m 2   URINALYSIS    Collection Time: 12/25/24  2:11 PM    Specimen: Urine   Result Value Ref Range    Color Dark Yellow     Character Clear     Specific Gravity 1.028 <1.035    Ph 5.0 5.0 - 8.0    Glucose Negative Negative mg/dL    Ketones 15 (A) Negative mg/dL    Protein 30 (A) Negative mg/dL    Bilirubin Negative Negative    Urobilinogen, Urine 1.0 <=1.0 EU/dL    Nitrite Negative Negative    Leukocyte Esterase Negative Negative    Occult Blood Negative Negative    Micro Urine Req Microscopic    URINE MICROSCOPIC (W/UA)    Collection Time: 12/25/24  2:11 PM   Result Value Ref Range    WBC 0-2 /hpf    RBC 0-2 0 - 2 /hpf    Bacteria None None /hpf    Epithelial Cells 0-2 0 - 5 /hpf    Mucous Threads Present /hpf    Urine Casts 3-5 (A) 0 - 2 /lpf    Hyaline Cast Present /lpf    Granular Casts Present (A) Absent /lpf    Sperm Present /hpf   All labs reviewed by me.    Radiology:   The attending Emergency Physician has independently interpreted the diagnostic imaging associated with this visit and is awaiting the final reading from the radiologist, which will be displayed below.  Preliminary interpretation is a follows: Constipation, no obstruction.  Radiologist interpretation:  [id number] VIEW   Final Result      Moderate constipation. No evidence of bowel obstruction.         COURSE & MEDICAL DECISION MAKING       INITIAL ASSESSMENT AND PLAN  Care Narrative:       3:37 PM - Patient was seen and evaluated at bedside. Patient presents to the ED for constipation.  After my exam, I discussed with the patient the plan of care, which includes treating the patient with medication for their symptoms, as well as obtaining lab work and imaging for further evaluation. Patient understands and verbalizes agreement to plan of care. Patient will be treated with Dulcolax 10 mg, magnesium citrate solution 296 mL. Ordered DX abdomen, urine microscopic, CBC w diff, CMP, lipase and UA to evaluate.   Differential  diagnoses include but not limited to: constipation, generalized weakness, inability to care for self, speech deficits     Patient was given oral laxatives as well as a soapsuds enema and had good results.  I did laboratories on the patient and reviewed his MRI of his brain.  I spoke with social work because I do not feel the patient can go home on his own, he is unable to ambulate on his legs as they are too weak, he is unable to care for himself and he has a wife that he is currently caring for at home who is bedridden on oxygen.    3:35 PM I discussed the patient's case and the above findings with Maci (Social Work).     4:15 PM - Paged Hospitalist.     4:39 PM - Hospitalist responded. I discussed the patient's case and the above findings with Dr. Kerns (Hospitalist) who agrees to evaluate the patient for hospitalization.                    DISPOSITION AND DISCUSSIONS  I have discussed management of the patient with the following physicians and JUAN A's: Dr. Kerns (Hospitalist)    Discussion of management with other [facility] or appropriate source(s): None     Escalation of care considered, and ultimately not performed: IV fluids.    Barriers to care at this time, including but not limited to:  None .     Decision tools and prescription drugs considered including, but not limited to: Hospital admission..    DISPOSITION:  Patient will be hospitalized by Dr. Kerns in guarded condition.    FINAL IMPRESSION   1. Constipation, unspecified constipation type    2. Speech and language deficits    3. Weakness of both lower extremities    4. Unable to care for self    5. Exudative age-related macular degeneration of left eye, unspecified stage ([facility])    6. Blindness of left eye, unspecified right eye visual impairment category       I, Ada Gallegos (Monserrat), am scribing for, and in the presence of, Monie Fallon D.O..    Electronically signed by: Ada Gallegos (Monserrat), 12/25/2024    Monie ZUNIGA D.O. personally  performed the services described in this documentation, as scribed by Ada Gallegos in my presence, and it is both accurate and complete.    The note accurately reflects work and decisions made by me.  Monie Fallon D.O.  12/25/2024  9:47 PM

## 2024-12-25 NOTE — ED NOTES
Xray at bedside now. Pt swallowed medications without issues.   BSC placed next to pt and pt instructed to pt to press call light once ready to transfer to BSC

## 2024-12-25 NOTE — ED NOTES
Pt had labs drawn out front.   States unable to provide urine sample as he's soiled himself.   Pt assisted to remove pants and get clean brief on. Urinal placed at bedside for sample collection.   Pt states injured his L Leg previously and was unable to get to his restroom. Has been holding his stool for some time and now unable to go.   Safety reviewed, call light within reach. Both side rails up for safety.

## 2024-12-26 PROBLEM — R62.7 ADULT FAILURE TO THRIVE: Status: ACTIVE | Noted: 2024-12-26

## 2024-12-26 LAB
ALBUMIN SERPL BCP-MCNC: 3.8 G/DL (ref 3.2–4.9)
ALBUMIN/GLOB SERPL: 1.7 G/DL
ALP SERPL-CCNC: 89 U/L (ref 30–99)
ALT SERPL-CCNC: 29 U/L (ref 2–50)
ANION GAP SERPL CALC-SCNC: 13 MMOL/L (ref 7–16)
AST SERPL-CCNC: 37 U/L (ref 12–45)
BASOPHILS # BLD AUTO: 0.6 % (ref 0–1.8)
BASOPHILS # BLD: 0.05 K/UL (ref 0–0.12)
BILIRUB SERPL-MCNC: 0.3 MG/DL (ref 0.1–1.5)
BUN SERPL-MCNC: 14 MG/DL (ref 8–22)
CALCIUM ALBUM COR SERPL-MCNC: 8.5 MG/DL (ref 8.5–10.5)
CALCIUM SERPL-MCNC: 8.3 MG/DL (ref 8.5–10.5)
CHLORIDE SERPL-SCNC: 104 MMOL/L (ref 96–112)
CO2 SERPL-SCNC: 22 MMOL/L (ref 20–33)
CREAT SERPL-MCNC: 0.77 MG/DL (ref 0.5–1.4)
EOSINOPHIL # BLD AUTO: 0.17 K/UL (ref 0–0.51)
EOSINOPHIL NFR BLD: 2.2 % (ref 0–6.9)
ERYTHROCYTE [DISTWIDTH] IN BLOOD BY AUTOMATED COUNT: 45.9 FL (ref 35.9–50)
GFR SERPLBLD CREATININE-BSD FMLA CKD-EPI: 98 ML/MIN/1.73 M 2
GLOBULIN SER CALC-MCNC: 2.3 G/DL (ref 1.9–3.5)
GLUCOSE SERPL-MCNC: 114 MG/DL (ref 65–99)
HCT VFR BLD AUTO: 34.4 % (ref 42–52)
HGB BLD-MCNC: 11.6 G/DL (ref 14–18)
IMM GRANULOCYTES # BLD AUTO: 0.01 K/UL (ref 0–0.11)
IMM GRANULOCYTES NFR BLD AUTO: 0.1 % (ref 0–0.9)
LYMPHOCYTES # BLD AUTO: 1.66 K/UL (ref 1–4.8)
LYMPHOCYTES NFR BLD: 21.4 % (ref 22–41)
MCH RBC QN AUTO: 32.8 PG (ref 27–33)
MCHC RBC AUTO-ENTMCNC: 33.7 G/DL (ref 32.3–36.5)
MCV RBC AUTO: 97.2 FL (ref 81.4–97.8)
MONOCYTES # BLD AUTO: 0.56 K/UL (ref 0–0.85)
MONOCYTES NFR BLD AUTO: 7.2 % (ref 0–13.4)
NEUTROPHILS # BLD AUTO: 5.3 K/UL (ref 1.82–7.42)
NEUTROPHILS NFR BLD: 68.5 % (ref 44–72)
NRBC # BLD AUTO: 0 K/UL
NRBC BLD-RTO: 0 /100 WBC (ref 0–0.2)
PLATELET # BLD AUTO: 210 K/UL (ref 164–446)
PMV BLD AUTO: 9.8 FL (ref 9–12.9)
POTASSIUM SERPL-SCNC: 3.7 MMOL/L (ref 3.6–5.5)
PROT SERPL-MCNC: 6.1 G/DL (ref 6–8.2)
RBC # BLD AUTO: 3.54 M/UL (ref 4.7–6.1)
SODIUM SERPL-SCNC: 139 MMOL/L (ref 135–145)
WBC # BLD AUTO: 7.8 K/UL (ref 4.8–10.8)

## 2024-12-26 PROCEDURE — G0378 HOSPITAL OBSERVATION PER HR: HCPCS

## 2024-12-26 PROCEDURE — 99233 SBSQ HOSP IP/OBS HIGH 50: CPT | Performed by: HOSPITALIST

## 2024-12-26 PROCEDURE — 700111 HCHG RX REV CODE 636 W/ 250 OVERRIDE (IP): Mod: JZ | Performed by: INTERNAL MEDICINE

## 2024-12-26 PROCEDURE — 96372 THER/PROPH/DIAG INJ SC/IM: CPT

## 2024-12-26 PROCEDURE — 97166 OT EVAL MOD COMPLEX 45 MIN: CPT

## 2024-12-26 PROCEDURE — 85025 COMPLETE CBC W/AUTO DIFF WBC: CPT

## 2024-12-26 PROCEDURE — 700105 HCHG RX REV CODE 258: Performed by: HOSPITALIST

## 2024-12-26 PROCEDURE — 97163 PT EVAL HIGH COMPLEX 45 MIN: CPT

## 2024-12-26 PROCEDURE — 36415 COLL VENOUS BLD VENIPUNCTURE: CPT

## 2024-12-26 PROCEDURE — 80053 COMPREHEN METABOLIC PANEL: CPT

## 2024-12-26 RX ORDER — POLYETHYLENE GLYCOL 3350 17 G/17G
1 POWDER, FOR SOLUTION ORAL DAILY
Status: DISCONTINUED | OUTPATIENT
Start: 2024-12-27 | End: 2024-12-29 | Stop reason: HOSPADM

## 2024-12-26 RX ORDER — AMOXICILLIN 250 MG
2 CAPSULE ORAL 2 TIMES DAILY
Status: DISCONTINUED | OUTPATIENT
Start: 2024-12-27 | End: 2024-12-29 | Stop reason: HOSPADM

## 2024-12-26 RX ORDER — SODIUM CHLORIDE 9 MG/ML
INJECTION, SOLUTION INTRAVENOUS CONTINUOUS
Status: DISCONTINUED | OUTPATIENT
Start: 2024-12-26 | End: 2024-12-27

## 2024-12-26 RX ORDER — POLYETHYLENE GLYCOL 3350 17 G/17G
1 POWDER, FOR SOLUTION ORAL
Status: DISCONTINUED | OUTPATIENT
Start: 2024-12-26 | End: 2024-12-26

## 2024-12-26 RX ADMIN — SODIUM CHLORIDE: 9 INJECTION, SOLUTION INTRAVENOUS at 19:45

## 2024-12-26 RX ADMIN — ENOXAPARIN SODIUM 40 MG: 100 INJECTION SUBCUTANEOUS at 17:07

## 2024-12-26 ASSESSMENT — COGNITIVE AND FUNCTIONAL STATUS - GENERAL
TOILETING: A LITTLE
HELP NEEDED FOR BATHING: A LITTLE
DRESSING REGULAR LOWER BODY CLOTHING: A LITTLE
PERSONAL GROOMING: A LITTLE
CLIMB 3 TO 5 STEPS WITH RAILING: A LOT
SUGGESTED CMS G CODE MODIFIER MOBILITY: CK
MOVING TO AND FROM BED TO CHAIR: A LITTLE
WALKING IN HOSPITAL ROOM: A LOT
DAILY ACTIVITIY SCORE: 19
DRESSING REGULAR UPPER BODY CLOTHING: A LITTLE
STANDING UP FROM CHAIR USING ARMS: A LITTLE
MOBILITY SCORE: 17
MOVING FROM LYING ON BACK TO SITTING ON SIDE OF FLAT BED: A LITTLE
SUGGESTED CMS G CODE MODIFIER DAILY ACTIVITY: CK

## 2024-12-26 ASSESSMENT — ENCOUNTER SYMPTOMS
WEAKNESS: 1
SPEECH CHANGE: 1
GASTROINTESTINAL NEGATIVE: 1
CONSTITUTIONAL NEGATIVE: 1
MUSCULOSKELETAL NEGATIVE: 1
RESPIRATORY NEGATIVE: 1
PSYCHIATRIC NEGATIVE: 1
EYES NEGATIVE: 1

## 2024-12-26 ASSESSMENT — FIBROSIS 4 INDEX: FIB4 SCORE: 2.16

## 2024-12-26 ASSESSMENT — GAIT ASSESSMENTS
ASSISTIVE DEVICE: FRONT WHEEL WALKER
DEVIATION: INCREASED BASE OF SUPPORT
GAIT LEVEL OF ASSIST: MINIMAL ASSIST

## 2024-12-26 ASSESSMENT — ACTIVITIES OF DAILY LIVING (ADL): TOILETING: INDEPENDENT

## 2024-12-26 ASSESSMENT — PAIN DESCRIPTION - PAIN TYPE
TYPE: ACUTE PAIN

## 2024-12-26 NOTE — CARE PLAN
The patient is Stable - Low risk of patient condition declining or worsening    Shift Goals  Clinical Goals: labs, PT/OT, constipation relief  Patient Goals: go home, do ADLs  Family Goals: ALTON    Problem: Mobility  Goal: Patient's capacity to carry out activities will improve  Description: Target End Date:  12/27/24    1.  Assess for barriers to mobility/activity  2.  Implement activity per interdisciplinary team recommendations  3.  Target activity level identified and patient/family/caregiver aware of goal  4.  Provide assistive devices  5.  Instruct patient/caregiver on proper use of assistive/adaptive devices  6.  Schedule activities and rest periods to decrease effects of fatigue  7.  Encourage mobilization to extent of ability  8.  Maintain proper body alignment  9.  Provide adequate pain management to allow progressive mobilization  Outcome: Progressing     Problem: Bowel Elimination  Goal: Establish and maintain regular bowel function  Description: Target End Date:  12/27/24    1.   Note date of last BM  2.   Educate about diet, fluid intake, medication and activity to promote bowel function  3.   Educate signs and symptoms of constipation and interventions to implement  4.   Pharmacologic bowel management per provider order  5.   Regular toileting schedule  6.   Upright position for toileting  7.   High fiber diet  8.   Encourage hydration  9.   Collaborate with Clinical Dietician  10. Care and maintenance of ostomy if applicable  Outcome: Progressing     Problem: Fall Risk  Goal: Patient will remain free from falls  Description: Target End Date:  12/27/24  1.  Assess for fall risk factors  2.  Implement fall precautions  Outcome: Progressing     Problem: Knowledge Deficit - Standard  Goal: Patient will demonstrate understanding of plan of care, disease process/condition, diagnostic tests and medications  Description: Target End Date:  12/27/24    1.  Patient oriented to unit, equipment, visitation policy  and means for communicating concern  2.  Complete/review Learning Assessment  3.  Assess knowledge level of disease process/condition, treatment plan, diagnostic tests and medications  4.  Explain disease process/condition, treatment plan, diagnostic tests and medications  Outcome: Progressing

## 2024-12-26 NOTE — PROGRESS NOTES
Report given to Misty PEDERSEN and Teresa RN on S6. This RN also called patient's wife Selma and updated her on plan of care. Patient denies pain at this time

## 2024-12-26 NOTE — PROGRESS NOTES
4 Eyes Skin Assessment Completed by SLAVA Danielle and SLAVA Camacho.    Head WDL  Ears WDL  Nose WDL  Mouth WDL  Neck WDL  Breast/Chest WDL  Shoulder Blades WDL  Spine WDL  (R) Arm/Elbow/Hand Bruising and Scab  (L) Arm/Elbow/Hand Bruising and Scab  Abdomen WDL  Groin WDL  Scrotum/Coccyx/Buttocks Redness and Blanching   (R) Leg Scab and Bruising  (L) Leg Scar, Scab, and Bruising  (R) Heel/Foot/Toe Redness, Blanching, and Boggy  (L) Heel/Foot/Toe Redness, Blanching, and Boggy          Devices In Places Blood Pressure Cuff, Pulse Ox, and Condom Cath      Interventions In Place Pillows and Barrier Cream    Possible Skin Injury No    Pictures Uploaded Into Epic N/A  Wound Consult Placed N/A  RN Wound Prevention Protocol Ordered No

## 2024-12-26 NOTE — PROGRESS NOTES
Bedside shift report received from SLAVA Gray. Patient A&Ox4, in bed resting comfortably. Pt exhibiting dysarthria; however, denies pain or other symptoms at this time. Bed in lowest, locked position with call light and belongings within reach. Fall precautions reviewed with patient. Patient updated on plan of care.

## 2024-12-26 NOTE — THERAPY
Physical Therapy   Initial Evaluation     Patient Name: Oscar Irizarry  Age:  64 y.o., Sex:  male  Medical Record #: [id number]  Today's Date: 12/26/2024     Precautions  Precautions: Fall Risk    Assessment  Pt presents with impaired activity tolerance, dynamic balance, speech and gait mechanics associated with chief complaint of constipation with inability to ambulate with recent MRI in outpatient for speech changes with some hypofunction noted in right cerebellar region in setting of recent left macular degeneration causing blindness, and progressive ataxia. Pt reports onset of ataxia developing over the last year, stopped drinking due to inability to access and has gotten progressively weaker at home; pt reports his neighbors assist with IADLs and he does not drive; reports his wife is independent despite reports of bedbound state in chart; functionally, pt requires a lot of redirect as to why mobility is needed to combat weakness and mobility role in normal GI function as he has stopped walking due to 15 falls over the last month; pt has strong posterior lean and weight bear through heels; no rigidity noted and is actually very flexible in regards to his legs; likely multifactorial given strong drinking history, recent bed rest state, poor PO intake/access and limited baseline ambulation over the last year; pt would benefit from SNF at this time; will follow.     Plan    Physical Therapy Initial Treatment Plan   Treatment Plan : Bed Mobility, Equipment, Gait Training, Manual Therapy, Neuro Re-Education / Balance, Stair Training, Self Care / Home Evaluation, Therapeutic Exercise, Therapeutic Activities  Treatment Frequency: 4 Times per Week  Duration: Until Therapy Goals Met    DC Equipment Recommendations: Unable to determine at this time  Discharge Recommendations: SNF if available to him though currently obs status; functionally, he is min A with FWW, has fallen 15 times in last month; if not then would at  minimal recommend home health PT however has remote home area and may not be available to him as well;        Abridged Subjective/Objective     12/26/24 1250   Prior Living Situation   Prior Services Other (Comments)  (intermittent support from neighbor)   Housing / Facility 1 Story House   Steps Into Home 3   Rail Left Rail  (Steps into Home)   Equipment Owned Front-Wheel Walker;Wheelchair   Lives with - Patient's Self Care Capacity Spouse   Comments spouse is independent within the home per pt; he no longer drives and neighbors do shopping; has a wheelchair but does not fit through home to get to bathroom; has fallen about 15 times over the last 1-2 months; stopped drinking 1 year ago due balance issues   Prior Level of Functional Mobility   Bed Mobility Independent   Transfer Status Independent   Ambulation Independent   Ambulation Distance to tolerance   Assistive Devices Used None   Stairs Independent   Cognition    Cognition / Consciousness X   Speech/ Communication Dysarthric   Level of Consciousness Alert   Ability To Follow Commands 1 Step   Safety Awareness Impaired;Impulsive   New Learning Impaired   Attention Impaired   Initiation Impaired   Comments reporting he is fatigued repetitively; difficult to understand, reports dysarthria started about 2 months ago   Passive ROM Lower Body   Passive ROM Lower Body WDL   Strength Lower Body   Lower Body Strength  X   Gross Strength Generalized Weakness, Equal Bilaterally   Sensation Lower Body   Lower Extremity Sensation   WDL   Vision   Vision Comments left eye blindness known at baseline reported stopping driving   Balance Assessment   Sitting Balance (Static) Fair   Sitting Balance (Dynamic) Fair -   Standing Balance (Static) Poor +   Standing Balance (Dynamic) Poor   Weight Shift Sitting Fair   Weight Shift Standing Poor   Comments B UE support in sitting/standing; strong posterior lean/weight bearing through heels in standing   Bed Mobility    Supine to Sit    (NT, up with OT)   Sit to Supine Contact Guard Assist  (slightly raised HOB)   Gait Analysis   Gait Level Of Assist Minimal Assist   Assistive Device Front Wheel Walker   Distance (Feet)   (two shuffled steps along bed pt declining ambulation)   Deviation Increased Base Of Support   Comments reporting he is too tired to walk due to being up all night for his constipation   Functional Mobility   Sit to Stand Minimal Assist  (with FWW; raised height of bed); seen dovetail with OT as pt refused to mobilize with only one person    Bed, Chair, Wheelchair Transfer Refused   Edema / Skin Assessment   Edema / Skin  X   Comments diffuse skin breakdown   Patient / Family Goals    Patient / Family Goal #1 to improve activity tolerance   Short Term Goals    Short Term Goal # 1 Pt will perform supine<>sit from flat HOB/no railing with supervision within 6 visits to ensure independent mobility at home.   Short Term Goal # 2 Pt will perform sit<>stand with FWW and supervision within 6 visits to ensure independent mobility at home.   Short Term Goal # 3 Pt will ambulate x 50ft with FWW and supervision within 6 visits to ensure independent mobility at home.   Short Term Goal # 4 Pt will asend/descend 3 stairs with unilateral UE Support and min A within 6 visits to ensure entry/exit of home.   Education Group   Role of Physical Therapist Patient Response Patient;Acceptance;Explanation;Demonstration;Verbal Demonstration;Action Demonstration   Gait Training Patient Response Patient;Acceptance;Explanation;Demonstration;Action Demonstration;Verbal Demonstration;Reinforcement Needed   Use of Assistive Device Patient Response Patient;Acceptance;Explanation;Demonstration;Action Demonstration;Verbal Demonstration;Reinforcement Needed   Additional Comments necessity of mobility even if feeling poorly due to constipation issues;

## 2024-12-26 NOTE — DISCHARGE PLANNING
[facility] Transitional Care Coordination    Referral from: Dr. Luevano    Insurance Provider on Facesheet: MCR-A only/ANT    Potential Rehab Diagnosis: Debility    Chart review indicates patient may have on going medical management and may have therapy needs to possibly meet inpatient rehab facility criteria with the goal of returning to community.    D/C support will need to be verified: Spouse    Physiatry consultation pended per protocol.  OT pending.     Thank you for the referral.

## 2024-12-26 NOTE — ASSESSMENT & PLAN NOTE
He has been falling and having balance issues chronically, reportedly worsening.  Additionally, he developed dysarthria.  On exam he does have some signs of parkinsonian syndrome such as cogwheel rigidity, but not tremor.  The patient might have been drinking alcohol and may have cerebellar atrophy secondary to it.     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  He has an appointment with neurology on January 30.  ESR 27, CRP 2.06, HIV negative.  TSH, B12 within normal limits.     Neurology consulted, they do not recommend initiation of Sinemet while inpatient.  Due to concern for possible atypical PD, at this time do not recommend initiation of Sinemet, as if confirmed atypical PD, can actually worsen his symptoms.  Patient to follow-up with neurology at his scheduled appointment on 1/30.  PT/OT

## 2024-12-26 NOTE — PROGRESS NOTES
"Bedside report received from Lolis PEDERSEN at this time. Patient sitting up in bed, satting 95% on room air, denies pain, speaking in full sentences, speech is difficult to understand as it is slurred which has been his baseline for \"a few months.\" Call bell in hand, fall precautions in place, patient is medical   "

## 2024-12-26 NOTE — H&P
Hospital Medicine History & Physical Note    Date of Service  12/25/2024    Primary Care Physician  Pcp Pt States None        Code Status  Full Code    Chief Complaint  Chief Complaint   Patient presents with    Constipation     Last BM x5 days ago, has not taken OTC constipation meds, lower abd pain 8/10, can't ambulate d/t weakness, able to urinate, states not diagnosed with any GI issues but constipation has happened before x2 weeks ago       History of Presenting Illness  Oscar Irizarry is a 64 y.o. male denies past medical history of operative degeneration who presented 12/25/2024 with complaints of constipation for 5 days, lower abdominal discomfort, generalized weakness, balance problem, falls.  Denies vertigo or dizziness.  The patient states that ataxia has been progressing.  He started having slurred speech several weeks ago.  He had MRI of the brain done on 12/7 ordered by PCP, that did not show acute stroke hemorrhage, or mass.  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  He has an appointment with neurology on January 30.  he stated he retired 2 years ago, before that he was working at [employer].  He has debilitated bedridden wife at home.  She denies drinking alcohol in the last 5 days.  History is somewhat limited due to dysarthria.  In ER blood pressure somewhat fluctuates with tendency to elevation.  X-ray of the abdomen showed constipation, no evidence of bowel obstruction.  He received enema with return of bowel movement, and resolution of abdominal pain.          I discussed the plan of care with patient and ERP .    Review of Systems  Review of Systems   Constitutional:  Negative for chills, fever and weight loss.   HENT:  Negative for ear pain, hearing loss and tinnitus.    Eyes:  Negative for blurred vision, double vision and photophobia.   Respiratory:  Negative for cough, hemoptysis and sputum production.    Cardiovascular:  Negative for chest pain,  palpitations and orthopnea.   Gastrointestinal:  Positive for constipation. Negative for heartburn, nausea and vomiting.   Genitourinary:  Negative for dysuria, flank pain, frequency and hematuria.   Musculoskeletal:  Positive for falls. Negative for back pain and neck pain.   Skin:  Negative for itching and rash.   Neurological:  Negative for tremors, speech change, focal weakness and headaches.   Endo/Heme/Allergies:  Negative for environmental allergies and polydipsia. Does not bruise/bleed easily.   Psychiatric/Behavioral:  Negative for hallucinations and substance abuse. The patient is not nervous/anxious.        Past Medical History   has no past medical history on file.    Surgical History   has no past surgical history on file.     Family History  family history is not on file.   Family history reviewed with patient. There is no family history that is pertinent to the chief complaint.     Social History   reports that he has never smoked. He has never used smokeless tobacco. He reports current alcohol use. He reports that he does not currently use drugs.    Allergies  No Known Allergies    Medications  None       Physical Exam  Temp:  [36.4 °C (97.5 °F)] 36.4 °C (97.5 °F)  Pulse:  [68-79] 74  Resp:  [16] 16  BP: (133-189)/(75-84) 141/75  SpO2:  [96 %-98 %] 98 %  Blood Pressure : (!) 141/75   Temperature: 36.4 °C (97.5 °F)   Pulse: 74   Respiration: 16   Pulse Oximetry: 98 %       Physical Exam  Vitals and nursing note reviewed.   Constitutional:       General: He is not in acute distress.     Appearance: Normal appearance.   HENT:      Head: Normocephalic and atraumatic.      Nose: Nose normal.      Mouth/Throat:      Mouth: Mucous membranes are moist.   Eyes:      Extraocular Movements: Extraocular movements intact.      Pupils: Pupils are equal, round, and reactive to light.   Cardiovascular:      Rate and Rhythm: Normal rate and regular rhythm.   Pulmonary:      Effort: Pulmonary effort is normal.       "Breath sounds: Normal breath sounds.   Abdominal:      General: Abdomen is flat. There is no distension.      Tenderness: There is no abdominal tenderness.   Musculoskeletal:         General: No swelling or deformity. Normal range of motion.      Cervical back: Normal range of motion and neck supple.   Skin:     General: Skin is warm and dry.   Neurological:      General: No focal deficit present.      Mental Status: He is alert and oriented to person, place, and time.      Cranial Nerves: Dysarthria present.      Comments: Bradykinesia.  Cogwheel rigidity   Psychiatric:         Mood and Affect: Mood normal.         Behavior: Behavior is slowed.         Laboratory:  Recent Labs     12/25/24  1305   WBC 6.9   RBC 3.83*   HEMOGLOBIN 12.6*   HEMATOCRIT 36.4*   MCV 95.0   MCH 32.9   MCHC 34.6   RDW 44.8   PLATELETCT 207   MPV 9.9     Recent Labs     12/25/24  1305   SODIUM 137   POTASSIUM 3.8   CHLORIDE 103   CO2 21   GLUCOSE 99   BUN 17   CREATININE 1.01   CALCIUM 9.3     Recent Labs     12/25/24  1305   ALTSGPT 32   ASTSGOT 68*   ALKPHOSPHAT 99   TBILIRUBIN 0.5   LIPASE 50   GLUCOSE 99         No results for input(s): \"NTPROBNP\" in the last 72 hours.      No results for input(s): \"TROPONINT\" in the last 72 hours.    Imaging:  [id number] VIEW   Final Result      Moderate constipation. No evidence of bowel obstruction.          X-Ray:  I have personally reviewed the images and compared with prior images.    Assessment/Plan:  Justification for Admission Status  I anticipate this patient will require at least two midnights for appropriate medical management, necessitating inpatient admission because constipation, falls, ataxia    Patient will need a Med/Surg bed on MEDICAL service .  The need is secondary to constipation, falls, ataxia.    * Constipation- (present on admission)  Assessment & Plan  He received enema in ER with return of bowel movement.  Will order scheduled MiraLAX twice daily    Ataxia and " dysarthria  Assessment & Plan  He has been falling and having balance issues chronically, reportedly worsening.  Additionally, he developed dysarthria.  On exam he does have some signs of parkinsonian syndrome such as  cogwheel rigidity, but not tremor.  The patient might have been drinking alcohol and may have cerebellar atrophy secondary to it.  MRI that was done recently on 12/7/2024 showed no acute findings.  There is Yeyo cisterna magna favoring the right (versus arachnoid cyst). Associated somewhat hypoplastic right cerebellar hemisphere.  Unclear clinical significance  Ordered PT OT evaluation.  Fall precautions.  We will check vitamin B12 and B1 level, TSH  Consider neurology consult or/and trial of Sinemet and vitamin supplementations    Elevated SGOT (AST)  Assessment & Plan  AST 68.  Will check CPK, TSH        VTE prophylaxis: enoxaparin ppx

## 2024-12-26 NOTE — PROGRESS NOTES
Patient transported to CDU at this time via [facility] with transport. In room 211. Ambulatory with walker from rney to bed, gait is shaky and unsteady. On room air. BP in 180s, patient denies hx of HTN. On room air satting 95%. Reports he lives with his wife but his neighbor drove him to the hospital. Belongings at bedside, patient denies taking any home medications. States he is hungry and wants to eat solid food, educated on liquid diet. Oriented to unit, in bed with bed alarm on. Due to unsteady gait, condom cath placed on patient.

## 2024-12-26 NOTE — ED NOTES
PIV placed. Lab called states will process add ons now.   MD at bedside.    Pt cleaned up from Enema and brief applied. Has own dentures in mouth.   Urinal emptied 200mL

## 2024-12-27 PROBLEM — H33.22 LEFT RETINAL DETACHMENT: Status: ACTIVE | Noted: 2024-12-27

## 2024-12-27 PROBLEM — E43 SEVERE PROTEIN-CALORIE MALNUTRITION (HCC): Status: ACTIVE | Noted: 2024-12-27

## 2024-12-27 LAB
ALBUMIN SERPL BCP-MCNC: 3.8 G/DL (ref 3.2–4.9)
ALBUMIN/GLOB SERPL: 1.5 G/DL
ALP SERPL-CCNC: 88 U/L (ref 30–99)
ALT SERPL-CCNC: 24 U/L (ref 2–50)
ANION GAP SERPL CALC-SCNC: 10 MMOL/L (ref 7–16)
AST SERPL-CCNC: 28 U/L (ref 12–45)
BILIRUB SERPL-MCNC: 0.3 MG/DL (ref 0.1–1.5)
BUN SERPL-MCNC: 10 MG/DL (ref 8–22)
CALCIUM ALBUM COR SERPL-MCNC: 9.1 MG/DL (ref 8.5–10.5)
CALCIUM SERPL-MCNC: 8.9 MG/DL (ref 8.5–10.5)
CHLORIDE SERPL-SCNC: 108 MMOL/L (ref 96–112)
CK SERPL-CCNC: 192 U/L (ref 0–154)
CO2 SERPL-SCNC: 24 MMOL/L (ref 20–33)
CREAT SERPL-MCNC: 0.82 MG/DL (ref 0.5–1.4)
CRP SERPL HS-MCNC: 2.06 MG/DL (ref 0–0.75)
ERYTHROCYTE [DISTWIDTH] IN BLOOD BY AUTOMATED COUNT: 47.1 FL (ref 35.9–50)
ERYTHROCYTE [SEDIMENTATION RATE] IN BLOOD BY WESTERGREN METHOD: 27 MM/HOUR (ref 0–20)
GFR SERPLBLD CREATININE-BSD FMLA CKD-EPI: 97 ML/MIN/1.73 M 2
GLOBULIN SER CALC-MCNC: 2.5 G/DL (ref 1.9–3.5)
GLUCOSE SERPL-MCNC: 103 MG/DL (ref 65–99)
HCT VFR BLD AUTO: 40.3 % (ref 42–52)
HGB BLD-MCNC: 13.2 G/DL (ref 14–18)
HIV 1+2 AB+HIV1 P24 AG SERPL QL IA: NORMAL
MCH RBC QN AUTO: 32.1 PG (ref 27–33)
MCHC RBC AUTO-ENTMCNC: 32.8 G/DL (ref 32.3–36.5)
MCV RBC AUTO: 98.1 FL (ref 81.4–97.8)
PLATELET # BLD AUTO: 219 K/UL (ref 164–446)
PMV BLD AUTO: 10.5 FL (ref 9–12.9)
POTASSIUM SERPL-SCNC: 3.9 MMOL/L (ref 3.6–5.5)
PROT SERPL-MCNC: 6.3 G/DL (ref 6–8.2)
RBC # BLD AUTO: 4.11 M/UL (ref 4.7–6.1)
SODIUM SERPL-SCNC: 142 MMOL/L (ref 135–145)
WBC # BLD AUTO: 5.4 K/UL (ref 4.8–10.8)

## 2024-12-27 PROCEDURE — 86038 ANTINUCLEAR ANTIBODIES: CPT

## 2024-12-27 PROCEDURE — 36415 COLL VENOUS BLD VENIPUNCTURE: CPT

## 2024-12-27 PROCEDURE — 99205 OFFICE O/P NEW HI 60 MIN: CPT | Performed by: STUDENT IN AN ORGANIZED HEALTH CARE EDUCATION/TRAINING PROGRAM

## 2024-12-27 PROCEDURE — A9270 NON-COVERED ITEM OR SERVICE: HCPCS | Performed by: GENERAL PRACTICE

## 2024-12-27 PROCEDURE — 85652 RBC SED RATE AUTOMATED: CPT

## 2024-12-27 PROCEDURE — 700102 HCHG RX REV CODE 250 W/ 637 OVERRIDE(OP): Performed by: HOSPITALIST

## 2024-12-27 PROCEDURE — 700111 HCHG RX REV CODE 636 W/ 250 OVERRIDE (IP): Mod: JZ | Performed by: INTERNAL MEDICINE

## 2024-12-27 PROCEDURE — 80053 COMPREHEN METABOLIC PANEL: CPT

## 2024-12-27 PROCEDURE — 700102 HCHG RX REV CODE 250 W/ 637 OVERRIDE(OP): Performed by: GENERAL PRACTICE

## 2024-12-27 PROCEDURE — 99233 SBSQ HOSP IP/OBS HIGH 50: CPT | Performed by: GENERAL PRACTICE

## 2024-12-27 PROCEDURE — A9270 NON-COVERED ITEM OR SERVICE: HCPCS | Performed by: HOSPITALIST

## 2024-12-27 PROCEDURE — G0378 HOSPITAL OBSERVATION PER HR: HCPCS

## 2024-12-27 PROCEDURE — G0475 HIV COMBINATION ASSAY: HCPCS

## 2024-12-27 PROCEDURE — 85027 COMPLETE CBC AUTOMATED: CPT

## 2024-12-27 PROCEDURE — 96372 THER/PROPH/DIAG INJ SC/IM: CPT

## 2024-12-27 PROCEDURE — 83516 IMMUNOASSAY NONANTIBODY: CPT | Mod: 91

## 2024-12-27 PROCEDURE — 86140 C-REACTIVE PROTEIN: CPT

## 2024-12-27 PROCEDURE — 700105 HCHG RX REV CODE 258: Performed by: HOSPITALIST

## 2024-12-27 PROCEDURE — 82550 ASSAY OF CK (CPK): CPT

## 2024-12-27 RX ORDER — AMOXICILLIN 250 MG
2 CAPSULE ORAL 2 TIMES DAILY
Status: SHIPPED
Start: 2024-12-27 | End: 2024-12-29

## 2024-12-27 RX ORDER — CARBIDOPA AND LEVODOPA 25; 100 MG/1; MG/1
1 TABLET ORAL EVERY 8 HOURS
Status: CANCELLED | OUTPATIENT
Start: 2024-12-27

## 2024-12-27 RX ORDER — POLYETHYLENE GLYCOL 3350 17 G/17G
17 POWDER, FOR SOLUTION ORAL DAILY
Status: SHIPPED
Start: 2024-12-28 | End: 2024-12-29

## 2024-12-27 RX ADMIN — POLYETHYLENE GLYCOL 3350 1 PACKET: 17 POWDER, FOR SOLUTION ORAL at 06:35

## 2024-12-27 RX ADMIN — ENOXAPARIN SODIUM 40 MG: 100 INJECTION SUBCUTANEOUS at 16:41

## 2024-12-27 RX ADMIN — SENNOSIDES AND DOCUSATE SODIUM 2 TABLET: 50; 8.6 TABLET ORAL at 16:40

## 2024-12-27 RX ADMIN — SODIUM CHLORIDE: 9 INJECTION, SOLUTION INTRAVENOUS at 03:10

## 2024-12-27 RX ADMIN — SENNOSIDES AND DOCUSATE SODIUM 2 TABLET: 50; 8.6 TABLET ORAL at 06:35

## 2024-12-27 ASSESSMENT — FIBROSIS 4 INDEX: FIB4 SCORE: 1.72

## 2024-12-27 ASSESSMENT — PAIN DESCRIPTION - PAIN TYPE
TYPE: ACUTE PAIN
TYPE: ACUTE PAIN

## 2024-12-27 ASSESSMENT — ENCOUNTER SYMPTOMS: FALLS: 1

## 2024-12-27 NOTE — ASSESSMENT & PLAN NOTE
MRI imaging also revealed left retinal detachment.  Patient was made aware and reports that he is already seen an ophthalmologist for this.  Patient now with left eye blindness.

## 2024-12-27 NOTE — PROGRESS NOTES
4 Eyes Skin Assessment Completed by SLAVA Shelley and SLAVA Masters.    Head Blanching and Redness  Ears WDL  Nose WDL  Mouth WDL  Neck WDL  Breast/Chest Multiple moles   Shoulder Blades Redness and Blanching, multiple moles  Spine Redness and Blanching, multiple moles  (R) Arm/Elbow/Hand Redness, Blanching, and Bruising, fragile scabs (elbow)  (L) Arm/Elbow/Hand Redness, Blanching, and Bruising, fragile scabs (elbow)  Abdomen Scar and Scab  Groin Redness and Blanching  Scrotum/Coccyx/Buttocks Redness and Blanching, excoriation   (R) Leg Redness, Scab, and Bruising  (L) Leg Redness, Scab, and Bruising  (R) Heel/Foot/Toe Redness, Blanching, Boggy, Bruising, Scar, and Scab  (L) Heel/Foot/Toe Redness, Blanching, Boggy, Bruising, Scar, and Scab, dry    Devices In Places PIV      Interventions In Place Heel Mepilex, Sacral Mepilex, Pillows, Elbow Mepilex, and Barrier Cream    Possible Skin Injury Yes    Pictures Uploaded Into Epic Yes  Wound Consult Placed N/A  RN Wound Prevention Protocol Ordered Yes

## 2024-12-27 NOTE — PROGRESS NOTES
Bedside report received from SLAVA Shelley. Patient resting in bed. Call bell within reach, bed alarm on and in place. All belongings within reach for patient. No further needs at this time.

## 2024-12-27 NOTE — THERAPY
Occupational Therapy   Initial Evaluation     Patient Name: Oscar Irizarry  Age:  64 y.o., Sex:  male  Medical Record #: [id number]  Today's Date: 12/26/2024     Precautions  Precautions: Fall Risk    Assessment    Patient is 64 y.o. male with a PMHx significant for macular degeneration. Presented to the hospital with c/o constipation x5 days, lower abdominal discomfort, generalized weakness, balance problems and recurrent falls. 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 Reports onset of ataxia has developed over the last year. Pt lives with his wife who is on home O2 and limited in ability to assist 2/2 own health challenges. Pt reports he and his wife are both independent with ADLs despite chart indicating wife with bedbound status. Neighbor/friend assists with IADLs.    Pt greeted and seen for OT omar. Required CGA - min A for all mobility and ADLs. Demonstrates heavy posterior lean with weightbearing through heels in standing with FWW. Pt tangential and somewhat difficult to redirect. Currently limited by impaired vision, cognition/safety awareness, coordination, strength, balance, activity tolerance, functional mobility and pain which are currently affecting patients ability to complete ADL/IADLs at baseline. Will continue to follow.    Plan    Occupational Therapy Initial Treatment Plan   Treatment Interventions: Self Care / Activities of Daily Living, Adaptive Equipment, Neuro Re-Education / Balance, Therapeutic Exercises, Therapeutic Activity, Family / Caregiver Training  Treatment Frequency: 3 Times per Week  Duration: Until Therapy Goals Met    DC Equipment Recommendations: Unable to determine at this time  Discharge Recommendations: Recommend post-acute placement for additional occupational therapy services prior to discharge home     Objective     12/26/24 1253   Prior Living Situation  "  Housing / Facility 1 Story House   Steps Into Home 3   Rail Left Rail  (Steps into Home)   Bathroom Set up Bathtub / Shower Combination;Grab Bars;Shower Chair   Equipment Owned Front-Wheel Walker;Wheelchair;Tub / Shower Seat;Grab Bar(s) In Tub / Shower   Lives with - Patient's Self Care Capacity Spouse   Comments Pt lives with spouse who is limited in ability to assist d/t own health challenges. Pt reports he and his wife are both independent with ADLs and received intermittent assist for IADLs from neighbor/friend. WC does not fit in the hallways; reports bathroom is 40 feet from the living room.   Prior Level of ADL Function   Self Feeding Independent   Grooming / Hygiene Independent   Bathing Independent   Dressing Independent   Toileting Independent   Prior Level of IADL Function   Medication Management Independent   Laundry Requires Assist   Kitchen Mobility Requires Assist   Finances Independent   Home Management Requires Assist   Shopping Dependent   Prior Level Of Mobility Independent With Device in Home   Driving / Transportation Relatives / Others Provide Transportation  (Neighbor does all of the driving)   Occupation (Pre-Hospital Vocational) Retired Due To Age   History of Falls   History of Falls Yes   Date of Last Fall   (Reports ~15 falls in the last 2-3 months)   Precautions   Precautions Fall Risk   Vitals   O2 Delivery Device None - Room Air   Vitals Comments No c/o dizziness or light headedness   Pain 0 - 10 Group   Therapist Pain Assessment During Activity;Post Activity Pain Same as Prior to Activity;Nurse Notified  (No c/o pain)   Cognition    Cognition / Consciousness X   Speech/ Communication Dysarthric  (intermittently difficult to understand)   Level of Consciousness Alert   Ability To Follow Commands 1 Step   Safety Awareness Impaired;Impulsive   New Learning Impaired   Attention Impaired   Initiation Impaired   Comments Pleasant and participatory. Perseverative on being \"tired\".  " Tangential and loquacious; intermittently difficult to redirect.   Active ROM Upper Body   Active ROM Upper Body  WDL   Dominant Hand Right   Strength Upper Body   Upper Body Strength  X   Gross Strength Generalized Weakness, Equal Bilaterally.    Sensation Upper Body   Upper Extremity Sensation  WDL   Neurological Concerns   Neurological Concerns Yes   Sitting Posture During ADL's Lateral Lean Left   Standing Posture During ADL's Posterior Lean   Coordination Upper Body   Coordination X   Comments Increased time and effort required for finger opposition; reports difficulty opening water bottles.   Balance Assessment   Sitting Balance (Static) Fair   Sitting Balance (Dynamic) Fair -   Standing Balance (Static) Poor +   Standing Balance (Dynamic) Poor   Weight Shift Sitting Fair   Weight Shift Standing Poor   Comments FWW in standing   Bed Mobility    Supine to Sit Contact Guard Assist   Sit to Supine Contact Guard Assist   Scooting Contact Guard Assist   Comments HOB flat with no use of rail; increased time and effort needed. Reports scooting toward base of bed at baseline; wife sleeps on L side of bed and pt sleeps on R side against a wall.   ADL Assessment   Eating Supervision   Grooming Contact Guard Assist;Seated  (wiped face)   Upper Body Dressing Minimal Assist   Lower Body Dressing Minimal Assist  (R LE sock)   Toileting   (condom cath and brief)   Functional Mobility   Sit to Stand Minimal Assist   Bed, Chair, Wheelchair Transfer Refused   Toilet Transfers Unable to Participate   Comments FWW in standing from elevated bed height   Visual Perception   Comments reading glasses at baseline   Edema / Skin Assessment   Edema / Skin  X   Comments diffuse skin breakdown   Activity Tolerance   Comments Limited by balance, weakness and pain   Short Term Goals   Short Term Goal # 1 Pt will be able to complete toilet/ADL transfer with SBA and no LOB   Short Term Goal # 2 Pt will be able to complete toileting ADLs with  SPV   Short Term Goal # 3 Pt will be able to complete g/h routine sitting unsupported with setup A   Education Group   Education Provided Role of Occupational Therapist   Role of Occupational Therapist Patient Response Patient;Acceptance;Explanation;Verbal Demonstration   Occupational Therapy Initial Treatment Plan    Treatment Interventions Self Care / Activities of Daily Living;Adaptive Equipment;Neuro Re-Education / Balance;Therapeutic Exercises;Therapeutic Activity;Family / Caregiver Training   Treatment Frequency 3 Times per Week   Duration Until Therapy Goals Met   Problem List   Problem List Decreased Active Daily Living Skills;Decreased Homemaking Skills;Decreased Upper Extremity Strength Right;Decreased Upper Extremity Strength Left;Decreased Functional Mobility;Decreased Activity Tolerance;Safety Awareness Deficits / Cognition;Impaired Posture / Trunk Alignment;Impaired Coordination Right Upper Extremity;Impaired Coordination Left Upper Extremity;Impaired Cognitive Function;Impaired Postural Control / Balance   Anticipated Discharge Equipment and Recommendations   DC Equipment Recommendations Unable to determine at this time   Discharge Recommendations Recommend post-acute placement for additional occupational therapy services prior to discharge home   Interdisciplinary Plan of Care Collaboration   IDT Collaboration with  Nursing;Physical Therapist   Patient Position at End of Therapy In Bed;Bed Alarm On;Call Light within Reach;Tray Table within Reach;Phone within Reach   Collaboration Comments OT report and recs; RN updated   Session Information   Date / Session Number  12/26, #1 (1/3, 1/1)

## 2024-12-27 NOTE — DIETARY
"Nutrition Services: Initial Assessment     Day 0 of admit. Oscar Irizarry is 64 y.o., male with admitting DX of Constipation [K59.00]  Adult failure to thrive [R62.7].    Consult Received for: MST - Malnutrition Screening Tool and FTT/Malnutrition    Current Hospital Problems List:  Adult Failure to thrive, constipation, ataxia and dysarthria        Nutrition Assessment:      Height: 185.4 cm (6' 1\")  Weight: 68.8 kg (151 lb 10.8 oz)  Weight taken via: Stand Up Scale  BMI Calculated: 20.01  BMI Classification: WNL     Weight Readings from Last 5 encounters:   Wt Readings from Last 5 Encounters:   12/26/24 68.8 kg (151 lb 10.8 oz)   08/06/24 93 kg (205 lb)     Noted severe weight loss of 54 lbs (26.3% ) x 4 months.     Objective:   Pertinent Medical Hx:  Adult Failure to thrive, constipation, ataxia and dysarthria   Pertinent Labs:  Glucose 103. Stat C - Reactive protein   Pertinent Meds: Senna, Miralax   Skin/Wounds:  n/a   Food Allergies: NKFA   Last BM:  Not observed  12/26/24       Current Diet Order/Intake:   Regular diet    Subjective:     See patient at bedside for MST. Noted slurred speech. The patient stated he is hungry and asking for more food. Oral intake during admission has been appropriate, meeting > 75% of most meals. The patient reported his usual body weight (UBW) as 190 lbs from 3 months ago. There has been a severe weight loss of 54 lbs (26.3%) over the past 4 months. The patient stated he has not been eating well at home, with a reported decrease in oral intake to < 50% at meals. NFPE observed moderate fat and muscle loss. I offered Ensure HP TID and added snacks between meals to help increase calorie and protein intake.    Patient reported UBW:  190 lbs x 3 months ago   Dietary Recall/Energy Intake: Patient report poor appetite prior admission.He stated  PO decreased to < 50% at meals   This indicates Sufficient energy intake.       Nutrition Focused Physical Exam (NFPE)  Weight Loss: " Severe weight loss of 26.3% x 4 months    RD Suspects this change related to inadequate oral intake .  Muscle Mass: Moderate Wasting at temple   Subcutaneous Fat: Moderate Loss at orbital   Fluid Accumulation: n/a   Reduced  Strength: N/A in acute care setting.    Nutrition Diagnosis:      Inadequate Oral Intake related to unintended weight loss  as evidenced by < 50% of estimated energy needs > 1 month .      Severe Malnutrition in context of Chronic Illness related to inadequate oral intake  as evidenced by < 50% of estimated energy needs > 1 month. Severe weight loss of 26.3% x 4 months     RD notified provider  Gisela Lane  .    Nutrition Interventions:        Recommend Ensure Plus High Protein (provides 350 calories) 20 g protein per 8 fl oz) TID.  Patient aware of active plan of care as appropriate.       Nutrition Monitoring and Evaluation:      Monitor nutrition POC, goal for > 50 % intake from meals and supplements.  Additional fluids per MD/DO  Monitor vital signs pertinent to nutrition.    RD following and will provide updated recommendations as indicated.      Ariella SORIANO/AND CRITERIA FOR MALNUTRITION

## 2024-12-27 NOTE — DISCHARGE PLANNING
Patient lives in [facility] with his wife that he cares for. He reports that he has a PCP in [place] but he does not remember the name. SNF referrals were sent out for the patient in accordance with blanket SNF policy. He is currently in observation status. [facility] [phone]       Case Management Discharge Planning    Admission Date: 12/25/2024  GMLOS:    ALOS: 0    6-Clicks ADL Score: 19  6-Clicks Mobility Score: 17  PT and/or OT Eval ordered: Yes  Post-acute Referrals Ordered: Yes  Post-acute Choice Obtained: No  Has referral(s) been sent to post-acute provider:  Yes      Anticipated Discharge Dispo: Discharge Disposition: D/T to home under [facility]HA care in anticipation of covered skilled care (06)    DME Needed: No    Action(s) Taken: Updated Provider/Nurse on Discharge Plan, Patient Conference, DC Assessment Complete (See below), Referral(s) sent, and Completed PASSR/LOC    Escalations Completed: None    Medically Clear: No    Next Steps: the plan will be discharge to home with outpatient therapy as no home health is available in his home area, vs discharge to SNF    Barriers to Discharge: Medical clearance    Is the patient up for discharge tomorrow: No

## 2024-12-27 NOTE — CARE PLAN
The patient is Stable - Low risk of patient condition declining or worsening    Shift Goals  Clinical Goals: Patient will remain free of falls through shift  Patient Goals: Rest  Family Goals: ALTON    Progress made toward(s) clinical / shift goals:    Problem: Pain - Standard  Goal: Alleviation of pain or a reduction in pain to the patient’s comfort goal  Outcome: Progressing     Problem: Knowledge Deficit - Standard  Goal: Patient and family/care givers will demonstrate understanding of plan of care, disease process/condition, diagnostic tests and medications  Outcome: Progressing     Problem: Fall Risk  Goal: Patient will remain free from falls  Outcome: Progressing     Problem: Mobility  Goal: Patient's capacity to carry out activities will improve  Outcome: Progressing       Patient is not progressing towards the following goals:

## 2024-12-27 NOTE — DISCHARGE PLANNING
1518  Agency/Facility Name: [facility]  Spoke To: Kindra   Outcome: DPA called regarding service area. They do not service [place]

## 2024-12-27 NOTE — CONSULTS
Physical Medicine and Rehabilitation Consultation              Date of initial consultation: 12/27/2024  Requesting provider: Jadon Luevano M.D.   Consulting provider: Erik Draper D.O.  Reason for consultation: assess for acute inpatient rehab appropriateness  LOS: 0 Day(s)    Chief complaint: falls, dysarthria    HPI: The patient is a 64 y.o.  male with history of L retinal detachment who presented on 12/25/2024  1:02 PM with abdominal pain, generalized weakness, balance problems and falls. His ataxia has been progressively worsening but he started having slurred speech several weeks prior to presentation. Had brain MRI 12/7 which did not show any acute stroke, hemorrhage or mass. Has an appointment with neurology Jan 30. In the ED, VS showed some mild hypertension, labs showed . KUB showed constipation, and patient received an enema. Patient admitted on observation status. Therapy evaluated and found to have limitations in ADLs and post acute services recommended. PM&R consulted to evaluate for possible inpatient rehab admission.      Current labs remarkable for . Vitals wnl today, recent hypertension.     On my evaluation, the patient complains of impaired speech, and poor coordination and falls. Patient denies fevers, chills, headache, chest pain, shortness of breath, cough, abdominal pain, nausea, vomiting, dysuria.      Social Hx:  Patient lives in a H with spouse in [place], [place].  His wife is on oxygen and would not be able to provide heavy support. He does have a daughter who lives in [place].   4 JACQUELYN  At prior level of function, patient was independent. However was having increasing falls, about 15 in the last 2 months. Has WC, but cannot fit through hallways. Uses a walker at home. No longer drives. Required assistance for many iADLs.     THERAPY:  PT: Functional mobility   12/26: Ambulating 2 small steps min A with FWW. Sit to stand min A. Refused transfer.     OT: ADLs  12/26:  Grooming contact-guard assist, upper body dressing min assist, lower body dressing min assist.     SLP:   none    IMAGIN2024: Echocardiogram  CONCLUSIONS  No prior study is available for comparison.   The ejection fraction is measured to be 65 % by Flanagan's biplane.  Normal left ventricular wall thickness.  The right ventricle is normal in size and systolic function.  No significant valvular disease.     2024: MRI brain with and without contrast  IMPRESSION:  1.  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 Associated somewhat hypoplastic right cerebellar hemisphere.  2.  Mild cerebral atrophy.  3.  Mild supratentorial white matter disease most consistent with microvascular ischemic change.  4.  Minimal pontine ischemic gliosis.  5.  No evidence of acute infarction, hemorrhage, or enhancing mass lesion.  6.  Markedly abnormal left globe demonstrating a retinal detachment or choroidal detachment.  7.  Referring clinician, KARENA SELBY was not available for immediate consultation. The patient was contacted by telephone regarding the findings of the left retinal detachment or choroidal detachment. Patient states he was aware of this finding and reports that he has already seen an ophthalmologist.    2024: XR abdomen  IMPRESSION:  Moderate constipation. No evidence of bowel obstruction.    PROCEDURES:  none    PMH:  History reviewed. No pertinent past medical history.    PSH:  History reviewed. No pertinent surgical history.    FHX:  History reviewed. No pertinent family history.    Medications:  Current Facility-Administered Medications   Medication Dose    senna-docusate (Pericolace Or Senokot S) 8.6-50 MG per tablet 2 Tablet  2 Tablet    polyethylene glycol/lytes (Miralax) Packet 1 Packet  1 Packet    enoxaparin (Lovenox) inj 40 mg  40 mg    acetaminophen (Tylenol) tablet 650 mg  650 mg    labetalol (Normodyne/Trandate) injection 10 mg  10 mg    ondansetron (Zofran)  "syringe/vial injection 4 mg  4 mg    ondansetron (Zofran ODT) dispertab 4 mg  4 mg       Allergies:  No Known Allergies      Physical Exam:  Vitals: /65   Pulse 71   Temp 36.6 °C (97.8 °F) (Temporal)   Resp 16   Ht 1.854 m (6' 1\")   Wt 68.8 kg (151 lb 10.8 oz)   SpO2 96%   Gen: NAD  Head: Normocephalic, atraumatic  Eyes/ Nose/ Mouth: PERRL, moist mucous membranes  Cardio: good distal perfusion, warm extremities  Pulm: normal respiratory effort, no cyanosis   Abd: Soft, Nontender, Nondistended   Ext: No peripheral edema. No calf tenderness. No clubbing.    Mental status: Alert. Oriented to person, place, month and year.   Speech: fluent, dysarthric, sometimes completely unintelligible, other times able to be understood.     CRANIAL NERVES:  2,3: visual acuity grossly intact in R eye, No vision in L eye, PERRL  3,4,6: EOMI bilaterally, no nystagmus or diplopia  5: sensation intact to light touch bilaterally and symmetric  7: no facial asymmetry  8: hearing grossly intact  9,10: symmetric palate elevation  11: Shoulder shrug present bilaterally  12: tongue protrudes midline    Motor:      Upper Extremity  Myotome R L   Shoulder flexion C5 5 5   Elbow flexion C5 5 5   Wrist extension C6 5 5   Elbow extension C7 5 5   Finger flexion C8 5 5   Finger abduction T1 5 5     Lower Extremity Myotome R L   Hip flexion L2 5 5   Knee extension L3 5 5   Ankle dorsiflexion L4 5 5   Toe extension L5 5 5   Ankle plantarflexion S1 5 5     Sensory:   intact to light touch through out arms and legs  Present but impaired proprioception at bilateral great toes    DTRs: 2+ in bilateral  biceps, 2+ in bilateral patellar tendons  No clonus at bilateral ankles  upgoing babinski b/l  positive Apodaca b/l     Tone: increased resistance to passive ROM in bilateral knees, difficulty to assess velocity dependence. Cogwheeling noted in bilateral arms.     Coordination:   ataxic finger to nose bilaterally  impaired fine motor with fingers " bilaterally  ataxic heel to shin bilaterally    Labs: Reviewed and significant for   Recent Labs     12/25/24  1305 12/26/24  0030 12/27/24  0304   RBC 3.83* 3.54* 4.11*   HEMOGLOBIN 12.6* 11.6* 13.2*   HEMATOCRIT 36.4* 34.4* 40.3*   PLATELETCT 207 210 219     Recent Labs     12/25/24  1305 12/26/24  0030 12/27/24  0304   SODIUM 137 139 142   POTASSIUM 3.8 3.7 3.9   CHLORIDE 103 104 108   CO2 21 22 24   GLUCOSE 99 114* 103*   BUN 17 14 10   CREATININE 1.01 0.77 0.82   CALCIUM 9.3 8.3* 8.9     Recent Results (from the past 24 hours)   HIV AG/AB COMBO ASSAY SCREENING    Collection Time: 12/27/24  3:04 AM   Result Value Ref Range    HIV Ag/Ab Combo Assay Non-Reactive Non Reactive   CREATINE KINASE    Collection Time: 12/27/24  3:04 AM   Result Value Ref Range    CPK Total 192 (H) 0 - 154 U/L   CBC WITHOUT DIFFERENTIAL    Collection Time: 12/27/24  3:04 AM   Result Value Ref Range    WBC 5.4 4.8 - 10.8 K/uL    RBC 4.11 (L) 4.70 - 6.10 M/uL    Hemoglobin 13.2 (L) 14.0 - 18.0 g/dL    Hematocrit 40.3 (L) 42.0 - 52.0 %    MCV 98.1 (H) 81.4 - 97.8 fL    MCH 32.1 27.0 - 33.0 pg    MCHC 32.8 32.3 - 36.5 g/dL    RDW 47.1 35.9 - 50.0 fL    Platelet Count 219 164 - 446 K/uL    MPV 10.5 9.0 - 12.9 fL   Comp Metabolic Panel    Collection Time: 12/27/24  3:04 AM   Result Value Ref Range    Sodium 142 135 - 145 mmol/L    Potassium 3.9 3.6 - 5.5 mmol/L    Chloride 108 96 - 112 mmol/L    Co2 24 20 - 33 mmol/L    Anion Gap 10.0 7.0 - 16.0    Glucose 103 (H) 65 - 99 mg/dL    Bun 10 8 - 22 mg/dL    Creatinine 0.82 0.50 - 1.40 mg/dL    Calcium 8.9 8.5 - 10.5 mg/dL    Correct Calcium 9.1 8.5 - 10.5 mg/dL    AST(SGOT) 28 12 - 45 U/L    ALT(SGPT) 24 2 - 50 U/L    Alkaline Phosphatase 88 30 - 99 U/L    Total Bilirubin 0.3 0.1 - 1.5 mg/dL    Albumin 3.8 3.2 - 4.9 g/dL    Total Protein 6.3 6.0 - 8.2 g/dL    Globulin 2.5 1.9 - 3.5 g/dL    A-G Ratio 1.5 g/dL   Sed Rate    Collection Time: 12/27/24  3:04 AM   Result Value Ref Range    Sed Rate  Monaeren 27 (H) 0 - 20 mm/hour   CRP QUANTITIVE (NON-CARDIAC)    Collection Time: 12/27/24  3:04 AM   Result Value Ref Range    Stat C-Reactive Protein 2.06 (H) 0.00 - 0.75 mg/dL   ESTIMATED GFR    Collection Time: 12/27/24  3:04 AM   Result Value Ref Range    GFR (CKD-EPI) 97 >60 mL/min/1.73 m 2         ASSESSMENT:  Patient is a 64 y.o. male admitted with increasing falls, ataxia, dysarthria.    [facility] Code / Diagnosis to Support: 0003.9 - Neurologic Conditions: Other Neurologic  See DISPO details below for recommendations on appropriate level of rehab for this diagnosis.    Barriers to transfer include: Insurance authorization, TCCs to verify disposition, medical clearance and bed availability     Assessment and Plan:    DISPO:  -recommend SNF  -Not a candidate for IPR due to lack of adequate DC support, and lacking in medical complexity.   -His wife has her own medical concerns and it seems that she would not be able to provide heavy assistance, which the patient may need. He has a daughter in [place] that may be able to provide support, but there are no plans for her to move here.   -Please reconsult or reach out via Voalte if further evaluation or medical management is requested     Medical Complexity:    Ataxia, dysarthria  -has had progressive balance problems, and recently developed dysarthria.   -MRI brain on 12/7 showed Yeyo cisterna magna favoring the right (versus arachnoid cyst). Associated somewhat hypoplastic right cerebellar hemisphere.   -Unclear clinical significance of MRI.   -Has some clinical signs of parkinson's disease with cogwheeling, LE rigidity, and poor coordination.   -Scheduled to see neurologist on 1/30/2024.   -PT and OT while in-house     Elevated CPK  -904 in the ED, now downtrending. Received IV fluids, now stopped.     Left retinal detachment  -occurred 3-4 months ago per patient    Constipation  -KUB obtained, showed moderate stool. Patient now with multiple BM's.   -miralax  daily, senna-docusate BID    Urinary frequency and urgency  -likely from underlying neurologic disorder  -recent UA negative  -using condom catheter    DVT PPX: lovenox      Thank you for allowing us to participate in the care of this patient.     Total time spent: 85 minutes.     Erik Draper D.O.   Physical Medicine and Rehabilitation     Please note that this dictation was created using voice recognition software. I have made every reasonable attempt to correct obvious errors, but there may be errors of grammar and possibly content that I did not discover before finalizing the note.

## 2024-12-27 NOTE — PROGRESS NOTES
[facility] Medicine Daily Progress Note    Date of Service  12/27/2024    Chief Complaint  Oscar Irizarry is a 64 y.o. male admitted 12/25/2024 with balance issues    Hospital Course  This is a 64-year-old male with past medical history of constipation who presented to the ED on 12/25 history 24 with weakness, balance problems, recurrent falls, ataxia and dysarthria.    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  He has an appointment with neurology on January 30.  ESR 27, CRP 2.06, HIV negative.  TSH, B12 within normal limits.  Neurology consulted, they do not recommend initiation of Sinemet while inpatient.  Due to concern for possible atypical PD, at this time do not recommend initiation of Sinemet, as if confirmed atypical PD, can actually worsen his symptoms.  Patient to follow-up with neurology at his scheduled appointment on 1/30.    MRI imaging also revealed left retinal detachment.  Patient was made aware and reports that he is already seen an ophthalmologist for this.  Patient now with left eye blindness.    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  He has an appointment with neurology on January 30.  ESR 27, CRP 2.06, HIV negative. TSH, B12 within normal limits. Neurology consulted, they do not recommend initiation of Sinemet while inpatient.  Due to concern for possible atypical PD, at this time do not recommend initiation of Sinemet, as if confirmed atypical PD, can actually worsen his symptoms. Patient to follow-up with neurology at his scheduled appointment on 1/30.    MRI imaging also revealed left retinal detachment.  Patient was made aware and reports that he is already seen an ophthalmologist for this.  Patient  now with left eye blindness.    Patient with constipation, relieved after initiated on bowel regimen. Patient to continue on maintenance bowel regimen.    Patient was seen by PT/OT with recommendations for SNF, unfortunately patient does not qualify for acute rehab.    Patient's wife was called, updated on plan of care, all questions answered.    I have discussed this patient's plan of care and discharge plan at IDT rounds today with Case Management, Nursing, Nursing leadership, and other members of the IDT team.    Consultants/Specialty  neurology    Code Status  Full Code    Disposition  Patient is medically clear for SNF vs HH.     I have placed the appropriate orders for post-discharge needs.    Review of Systems  Review of Systems   Musculoskeletal:  Positive for falls.   All other systems reviewed and are negative.       Physical Exam  Temp:  [36.4 °C (97.6 °F)-36.7 °C (98.1 °F)] 36.6 °C (97.8 °F)  Pulse:  [60-85] 71  Resp:  [16] 16  BP: (113-139)/(65-73) 121/65  SpO2:  [95 %-97 %] 96 %    Physical Exam  Vitals and nursing note reviewed.   Constitutional:       General: He is not in acute distress.  HENT:      Head: Normocephalic and atraumatic.   Eyes:      Extraocular Movements: Extraocular movements intact.      Conjunctiva/sclera: Conjunctivae normal.      Pupils: Pupils are equal, round, and reactive to light.   Cardiovascular:      Rate and Rhythm: Normal rate and regular rhythm.      Pulses: Normal pulses.      Heart sounds: No murmur heard.     No friction rub. No gallop.   Pulmonary:      Effort: Pulmonary effort is normal. No respiratory distress.      Breath sounds: Normal breath sounds. No wheezing, rhonchi or rales.   Abdominal:      General: Bowel sounds are normal. There is no distension.      Palpations: Abdomen is soft.      Tenderness: There is no abdominal tenderness.   Musculoskeletal:         General: No swelling or tenderness. Normal range of motion.      Cervical back: Normal range of  motion and neck supple. No muscular tenderness.      Right lower leg: No edema.      Left lower leg: No edema.   Skin:     General: Skin is warm and dry.      Capillary Refill: Capillary refill takes less than 2 seconds.      Findings: No bruising, erythema or rash.   Neurological:      General: No focal deficit present.      Mental Status: He is alert and oriented to person, place, and time.      Cranial Nerves: Dysarthria present.      Motor: Weakness and atrophy present.      Gait: Gait abnormal.      Comments: Bradykinesia.  Cogwheel rigidity          Fluids    Intake/Output Summary (Last 24 hours) at 12/27/2024 1503  Last data filed at 12/27/2024 1400  Gross per 24 hour   Intake 780 ml   Output 3300 ml   Net -2520 ml        Laboratory  Recent Labs     12/25/24  1305 12/26/24  0030 12/27/24  0304   WBC 6.9 7.8 5.4   RBC 3.83* 3.54* 4.11*   HEMOGLOBIN 12.6* 11.6* 13.2*   HEMATOCRIT 36.4* 34.4* 40.3*   MCV 95.0 97.2 98.1*   MCH 32.9 32.8 32.1   MCHC 34.6 33.7 32.8   RDW 44.8 45.9 47.1   PLATELETCT 207 210 219   MPV 9.9 9.8 10.5     Recent Labs     12/25/24  1305 12/26/24  0030 12/27/24  0304   SODIUM 137 139 142   POTASSIUM 3.8 3.7 3.9   CHLORIDE 103 104 108   CO2 21 22 24   GLUCOSE 99 114* 103*   BUN 17 14 10   CREATININE 1.01 0.77 0.82   CALCIUM 9.3 8.3* 8.9                   Imaging  [id number] VIEW   Final Result      Moderate constipation. No evidence of bowel obstruction.           Assessment/Plan  * Ataxia and dysarthria- (present on admission)  Assessment & Plan  He has been falling and having balance issues chronically, reportedly worsening.  Additionally, he developed dysarthria.  On exam he does have some signs of parkinsonian syndrome such as cogwheel rigidity, but not tremor.  The patient might have been drinking alcohol and may have cerebellar atrophy secondary to it.     Patient had MRI imaging performed on 12/7 which did not show any acute stroke, hemorrhage or mass.There is maurice cisterna magna  and hypoplasia of right cerebellar hemisphere.  He has an appointment with neurology on January 30.  ESR 27, CRP 2.06, HIV negative.  TSH, B12 within normal limits.     Neurology consulted, they do not recommend initiation of Sinemet while inpatient.  Due to concern for possible atypical PD, at this time do not recommend initiation of Sinemet, as if confirmed atypical PD, can actually worsen his symptoms.  Patient to follow-up with neurology at his scheduled appointment on 1/30.  PT/OT    Adult failure to thrive- (present on admission)  Assessment & Plan  Pt/ot     placement    Constipation- (present on admission)  Assessment & Plan  Resolved  Continue with bowel regimen    Left retinal detachment  Assessment & Plan  MRI imaging also revealed left retinal detachment.  Patient was made aware and reports that he is already seen an ophthalmologist for this.  Patient now with left eye blindness.    Elevated SGOT (AST)- (present on admission)  Assessment & Plan  resolved         VTE prophylaxis: Lovenox    I have performed a physical exam and reviewed and updated ROS and Plan today (12/27/2024). In review of yesterday's note (12/26/2024), there are no changes except as documented above.    Greater than 51 minutes spent prepping to see patient (e.g. reviewing EMR) obtaining and/or reviewing separately obtained history. Performing a medically appropriate examination and evaluation. Independently interpreting results and communicating results to patient/family/caregiver. Counseling and educating the patient/family/caregiver. Ordering medications, tests, and/or procedures. Referring and communicating with other health care professionals.  Documenting clinical information in EPIC. Care coordination.

## 2024-12-27 NOTE — CARE PLAN
The patient is Stable - Low risk of patient condition declining or worsening    Shift Goals  Clinical Goals: Patient will remain free of falls through shift  Patient Goals: Rest  Family Goals: ALTON    Progress made toward(s) clinical / shift goals:    Problem: Pain - Standard  Goal: Alleviation of pain or a reduction in pain to the patient’s comfort goal  12/27/2024 1434 by Teresa Chowdhury RMLAENA.  Outcome: Progressing  12/27/2024 1431 by Teresa Chowdhury R.N.  Outcome: Progressing     Problem: Knowledge Deficit - Standard  Goal: Patient and family/care givers will demonstrate understanding of plan of care, disease process/condition, diagnostic tests and medications  12/27/2024 1434 by Teresa Chowdhury R.N.  Outcome: Progressing  12/27/2024 1431 by ALBERTO BustilloN.  Outcome: Progressing     Problem: Fall Risk  Goal: Patient will remain free from falls  12/27/2024 1434 by Teresa Chowdhury R.N.  Outcome: Progressing  12/27/2024 1431 by Teersa Chowdhury R.N.  Outcome: Progressing     Problem: Mobility  Goal: Patient's capacity to carry out activities will improve  12/27/2024 1434 by ALBERTO BustilloN.  Outcome: Progressing  12/27/2024 1431 by ALBERTO BustilloN.  Outcome: Progressing     Problem: Skin Integrity  Goal: Skin integrity is maintained or improved  Outcome: Progressing       Patient is not progressing towards the following goals:

## 2024-12-27 NOTE — PROGRESS NOTES
Assumed care of patient to floor. All belongings gathered. Patient oriented to room. VSS. No further needs at this time. Call bell left within reach, bed alarm in place. Skin check completed. Meds administered per mar.

## 2024-12-27 NOTE — HOSPITAL COURSE
This is a 64-year-old male with past medical history of constipation who presented to the ED on 12/25 history 24 with weakness, balance problems, recurrent falls, ataxia and dysarthria.    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  He has an appointment with neurology on January 30.  ESR 27, CRP 2.06, HIV negative.  TSH, B12 within normal limits.  Neurology consulted, they do not recommend initiation of Sinemet while inpatient.  Due to concern for possible atypical PD, at this time do not recommend initiation of Sinemet, as if confirmed atypical PD, can actually worsen his symptoms.  Patient to follow-up with neurology at his scheduled appointment on 1/30.  Discussed with patient, patient's wife, they will have their friend assist them at home, patient has a wheelchair, we ordered a bedside commode, and friend will be able to transfer him to his appointment on January 30.  I have asked the wife to call neurology office to see if his appointment can be bumped up given that he was just recently hospitalized.    MRI imaging also revealed left retinal detachment.  Patient was made aware and reports that he is already seen an ophthalmologist for this.  Patient now with left eye blindness.

## 2024-12-27 NOTE — PROGRESS NOTES
4 Eyes Skin Assessment Completed by SLAVA Frank and Teresa/SLAVA Jay.    Head Blanching and Redness posterior  Ears WDL  Nose WDL  Mouth WDL  Neck WDL  Breast/Chest WDL  Shoulder Blades WDL  Spine WDL  (R) Arm/Elbow/Hand Redness, Blanching, and Scab  (L) Arm/Elbow/Hand Redness, Blanching, and Scab  Abdomen WDL  Groin WDL  Scrotum/Coccyx/Buttocks Redness, Blanching, and Excoriation small tear (probably from so many bm's earlier today), r ischium bruise (healing)  (R) Leg Bruising, scab   (L) Leg Scab  (R) Heel/Foot/Toe Scab - heel wdl   (L) Heel/Foot/Toe Scab,  - heel wdl - rash? Dried area  left lateral foot           Devices In Places Pulse Ox and Condom Cath      Interventions In Place Heel Mepilex, Sacral Mepilex, TAP System, Pillows, Elbow Mepilex, Q2 Turns, and Dri-Jarad Pads    Possible Skin Injury Yes    Pictures Uploaded Into Epic Yes  Wound Consult Placed Yes  RN Wound Prevention Protocol Ordered Yes

## 2024-12-27 NOTE — DISCHARGE PLANNING
Please review the consult from Dr. Draper regarding post acute recommendations.  [facility] will no longer follow.  Please reach out to myself with any questions.

## 2024-12-27 NOTE — CARE PLAN
The patient is Stable - Low risk of patient condition declining or worsening    Shift Goals  Clinical Goals: PT/OT, labwork  Patient Goals: Rest  Family Goals: ALTON    Progress made toward(s) clinical / shift goals: Patient is resting in bed, alert and oriented x 3, reoriented to time. Pt is exhibiting dysarthria during conversation. Denies pain or SOB. Medicated per MAR and tolerated well. Skin check completed. Fall precautions and hourly rounding in place. Needs attended.     Patient is not progressing towards the following goals:

## 2024-12-27 NOTE — PROGRESS NOTES
Hospital Medicine Daily Progress Note    Date of Service  12/26/2024    Chief Complaint  Oscar Irizarry is a 64 y.o. male admitted 12/25/2024 with weakness, ataxia, speech changes    Hospital Course  Oscar Irizarry is a 64 y.o. male denies past medical history of operative degeneration who presented 12/25/2024 with complaints of constipation for 5 days, lower abdominal discomfort, generalized weakness, balance problem, falls.  Denies vertigo or dizziness.  The patient states that ataxia has been progressing.  He started having slurred speech several weeks ago.  He had MRI of the brain done on 12/7 ordered by PCP, that did not show acute stroke hemorrhage, or mass.  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  He has an appointment with neurology on January 30.  he stated he retired 2 years ago, before that he was working at [employer].  He has debilitated bedridden wife at home.  She denies drinking alcohol in the last 5 days.  History is somewhat limited due to dysarthria.  In ER blood pressure somewhat fluctuates with tendency to elevation.  X-ray of the abdomen showed constipation, no evidence of bowel obstruction.  He received enema with return of bowel movement, and resolution of abdominal pain.    Interval Problem Update  Constipation resolved as pt has had multiple BMs    Seen bt pt/ot- post acute services recommended.. rehab consulted    Pt symptoms have been going on for months but weakness is getting worse    Recent Mri results noted- no acute findings    I have discussed this patient's plan of care and discharge plan at IDT rounds today with Case Management, Nursing, Nursing leadership, and other members of the IDT team.    Consultants/Specialty      Code Status  Full Code    Disposition  The patient is not medically cleared for discharge to home or a post-acute facility.  Anticipate discharge to: an inpatient rehabilitation hospital    I have placed the  appropriate orders for post-discharge needs.    Review of Systems  Review of Systems   Constitutional: Negative.    HENT: Negative.     Eyes: Negative.    Respiratory: Negative.     Gastrointestinal: Negative.    Genitourinary: Negative.    Musculoskeletal: Negative.    Neurological:  Positive for speech change and weakness.   Psychiatric/Behavioral: Negative.          Physical Exam  Temp:  [36.4 °C (97.6 °F)-37.7 °C (99.8 °F)] 36.4 °C (97.6 °F)  Pulse:  [74-85] 85  Resp:  [16-18] 16  BP: (107-184)/(60-83) 130/67  SpO2:  [92 %-97 %] 95 %    Physical Exam  Vitals reviewed.   Constitutional:       General: He is not in acute distress.     Appearance: He is ill-appearing.   HENT:      Mouth/Throat:      Mouth: Mucous membranes are moist.   Eyes:      General: No scleral icterus.     Extraocular Movements: Extraocular movements intact.      Pupils: Pupils are equal, round, and reactive to light.   Cardiovascular:      Rate and Rhythm: Normal rate and regular rhythm.      Heart sounds: No murmur heard.  Pulmonary:      Effort: No respiratory distress.      Breath sounds: No stridor.   Abdominal:      General: Abdomen is flat. There is no distension.      Tenderness: There is no abdominal tenderness.   Musculoskeletal:      Cervical back: Normal range of motion.   Skin:     General: Skin is warm.      Capillary Refill: Capillary refill takes 2 to 3 seconds.   Neurological:      Mental Status: He is oriented to person, place, and time.      Motor: Weakness present.      Gait: Gait abnormal.      Comments: Slurred speech   Psychiatric:         Mood and Affect: Mood normal.         Behavior: Behavior normal.         Fluids    Intake/Output Summary (Last 24 hours) at 12/26/2024 1617  Last data filed at 12/26/2024 1400  Gross per 24 hour   Intake --   Output 600 ml   Net -600 ml        Laboratory  Recent Labs     12/25/24  1305 12/26/24  0030   WBC 6.9 7.8   RBC 3.83* 3.54*   HEMOGLOBIN 12.6* 11.6*   HEMATOCRIT 36.4* 34.4*    MCV 95.0 97.2   MCH 32.9 32.8   MCHC 34.6 33.7   RDW 44.8 45.9   PLATELETCT 207 210   MPV 9.9 9.8     Recent Labs     12/25/24  1305 12/26/24  0030   SODIUM 137 139   POTASSIUM 3.8 3.7   CHLORIDE 103 104   CO2 21 22   GLUCOSE 99 114*   BUN 17 14   CREATININE 1.01 0.77   CALCIUM 9.3 8.3*                   Imaging  [id number] VIEW   Final Result      Moderate constipation. No evidence of bowel obstruction.           Assessment/Plan  * Ataxia and dysarthria- (present on admission)  Assessment & Plan  He has been falling and having balance issues chronically, reportedly worsening.  Additionally, he developed dysarthria.  On exam he does have some signs of parkinsonian syndrome such as  cogwheel rigidity, but not tremor.  The patient might have been drinking alcohol and may have cerebellar atrophy secondary to it.  MRI that was done recently on 12/7/2024 showed no acute findings.  There is Yeyo cisterna magna favoring the right (versus arachnoid cyst). Associated somewhat hypoplastic right cerebellar hemisphere.  Unclear clinical significance      Noted b12 wnl    Tsh wnl    Elevated cpk    Will repeat cpk in am    Iv hydration ordered    Check esr, crp  Consider neurology consult or/and trial of Sinemet     Adult failure to thrive- (present on admission)  Assessment & Plan  Pt/ot     placement    Elevated SGOT (AST)- (present on admission)  Assessment & Plan  resolved    Constipation- (present on admission)  Assessment & Plan  resolved         VTE prophylaxis: lovenox    I have performed a physical exam and reviewed and updated ROS and Plan today (12/26/2024). In review of yesterday's note (12/25/2024), there are no changes except as documented above.      Greater than 52 minutes spent prepping to see patient (e.g. review of tests) obtaining and/or reviewing separately obtained history. Performing a medically appropriate examination and/ evaluation.  Counseling and educating the patient/family/caregiver.  Ordering  medications, tests, or procedures.  Referring and communicating with other health care professionals.  Documenting clinical information in EPIC.  Independently interpreting results and communicating results to patient/family/caregiver.  Care coordination.

## 2024-12-27 NOTE — FACE TO FACE
Face to Face Note  -  Durable Medical Equipment    Gisela Lane D.O. - NPI: [id number]  I certify that this patient is under my care and that they have had a durable medical equipment(DME)face to face encounter by myself that meets the physician DME face-to-face encounter requirements with this patient on:    Date of encounter:   Patient:                    MRN:                       YOB: 2026  Oscar Irizarry  [id number]  1960     The encounter with the patient was in whole, or in part, for the following medical condition, which is the primary reason for durable medical equipment:  Other - Debility    I certify that, based on my findings, the following durable medical equipment is medically necessary:  3 in 1 Bedside [place].        ------------------------------------------------------------------------------------------------------------------    Face to Face Supporting Documentation - Home Health    The encounter with this patient was in whole or in part the primary reason for home health admission.    Date of encounter:   Patient:                    MRN:                       YOB: 2026  Oscar Irizarry  [id number]  1960     Home health to see patient for:  Skilled Nursing care for assessment, interventions & education, Registered dietitian consult, Medical social work consult, Home health aide, Physical Therapy evaluation and treatment, and Occupational therapy evaluation and treatment    Skilled need for:  New Onset Medical Diagnosis Ataxia    Skilled nursing interventions to include:  Comment: HH/PT/OT    Homebound evidenced status by:  Need the aid of supportive devices such as crutches, canes, wheelchairs or walkers. Leaving home must require a considerable and taxing effort. There must exist a normal inability to leave the home.  HH/PT/OT  Community Physician to provide follow up care: Pcp Pt States None     Optional Interventions    Wound  information & treatment:    Home Infusion Therapy orders:    Line/Drain/Airway:    I certify the face to face encounter for this home care referral meets the CMS requirements and the encounter/clinical assessment with the patient was, in whole, or in part, for the medical condition(s) listed above, which is the primary reason for home health care. Based on my clinical findings: the service(s) are medically necessary, support the need for home health care, and the homebound criteria are met.  I certify that this patient has had a face to face encounter by myself.  Gisela Lane D.O. - NPI: [id number]    *Debility, frailty and advanced age in the absence of an acute deterioration or exacerbation of a condition do not qualify a patient for home health.

## 2024-12-28 LAB — VIT B1 BLD-MCNC: 99 NMOL/L (ref 70–180)

## 2024-12-28 PROCEDURE — A9270 NON-COVERED ITEM OR SERVICE: HCPCS | Performed by: GENERAL PRACTICE

## 2024-12-28 PROCEDURE — 700111 HCHG RX REV CODE 636 W/ 250 OVERRIDE (IP): Mod: JZ | Performed by: INTERNAL MEDICINE

## 2024-12-28 PROCEDURE — 700102 HCHG RX REV CODE 250 W/ 637 OVERRIDE(OP): Performed by: GENERAL PRACTICE

## 2024-12-28 PROCEDURE — 99232 SBSQ HOSP IP/OBS MODERATE 35: CPT | Performed by: GENERAL PRACTICE

## 2024-12-28 PROCEDURE — 96372 THER/PROPH/DIAG INJ SC/IM: CPT

## 2024-12-28 PROCEDURE — 700102 HCHG RX REV CODE 250 W/ 637 OVERRIDE(OP): Performed by: HOSPITALIST

## 2024-12-28 PROCEDURE — A9270 NON-COVERED ITEM OR SERVICE: HCPCS | Performed by: HOSPITALIST

## 2024-12-28 PROCEDURE — G0378 HOSPITAL OBSERVATION PER HR: HCPCS

## 2024-12-28 RX ADMIN — POLYETHYLENE GLYCOL 3350 1 PACKET: 17 POWDER, FOR SOLUTION ORAL at 04:24

## 2024-12-28 RX ADMIN — ENOXAPARIN SODIUM 40 MG: 100 INJECTION SUBCUTANEOUS at 16:55

## 2024-12-28 RX ADMIN — SENNOSIDES AND DOCUSATE SODIUM 2 TABLET: 50; 8.6 TABLET ORAL at 04:24

## 2024-12-28 ASSESSMENT — PAIN DESCRIPTION - PAIN TYPE
TYPE: ACUTE PAIN
TYPE: ACUTE PAIN

## 2024-12-28 ASSESSMENT — ENCOUNTER SYMPTOMS: FALLS: 1

## 2024-12-28 NOTE — PROGRESS NOTES
[facility] Medicine Daily Progress Note    Date of Service  12/28/2024    Chief Complaint  Oscar Irizarry is a 64 y.o. male admitted 12/25/2024 with balance issues    Hospital Course  This is a 64-year-old male with past medical history of constipation who presented to the ED on 12/25 history 24 with weakness, balance problems, recurrent falls, ataxia and dysarthria.    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  He has an appointment with neurology on January 30.  ESR 27, CRP 2.06, HIV negative.  TSH, B12 within normal limits.  Neurology consulted, they do not recommend initiation of Sinemet while inpatient.  Due to concern for possible atypical PD, at this time do not recommend initiation of Sinemet, as if confirmed atypical PD, can actually worsen his symptoms.  Patient to follow-up with neurology at his scheduled appointment on 1/30.    MRI imaging also revealed left retinal detachment.  Patient was made aware and reports that he is already seen an ophthalmologist for this.  Patient now with left eye blindness.    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  He has an appointment with neurology on January 30.  ESR 27, CRP 2.06, HIV negative. TSH, B12 within normal limits.     Neurology consulted, they do not recommend initiation of Sinemet while inpatient.  Due to concern for possible atypical PD, at this time do not recommend initiation of Sinemet, as if confirmed atypical PD, can actually worsen his symptoms. Patient to follow-up with neurology at his scheduled appointment on 1/30.    MRI imaging also revealed left retinal detachment.  Patient was made aware and reports that he is already seen an ophthalmologist for this.   Patient now with left eye blindness.    Patient with constipation, relieved after initiated on bowel regimen. Patient to continue on maintenance bowel regimen.    Patient was seen by PT/OT with recommendations for SNF, unfortunately patient does not qualify for acute rehab.    Given patient has had about 15 falls over the past 2 months, patient's wife is oxygen dependent on 6 L, no home health availability due to patient's residence location, CM to investigate further if placement in skilled nursing facility would be achievable, given patient is currently under observation status.    I have discussed this patient's plan of care and discharge plan at IDT rounds today with Case Management, Nursing, Nursing leadership, and other members of the IDT team.    Consultants/Specialty  neurology    Code Status  Full Code    Disposition  Patient is medically clear for SNF vs HH.     I have placed the appropriate orders for post-discharge needs.    Review of Systems  Review of Systems   Musculoskeletal:  Positive for falls.   All other systems reviewed and are negative.       Physical Exam  Temp:  [36.3 °C (97.4 °F)-36.8 °C (98.2 °F)] 36.3 °C (97.4 °F)  Pulse:  [69-91] 74  Resp:  [16-18] 16  BP: (110-159)/(66-89) 110/66  SpO2:  [95 %-97 %] 96 %    Physical Exam  Vitals and nursing note reviewed.   Constitutional:       General: He is not in acute distress.  HENT:      Head: Normocephalic and atraumatic.   Eyes:      Extraocular Movements: Extraocular movements intact.      Conjunctiva/sclera: Conjunctivae normal.      Pupils: Pupils are equal, round, and reactive to light.   Cardiovascular:      Rate and Rhythm: Normal rate and regular rhythm.      Pulses: Normal pulses.      Heart sounds: No murmur heard.     No friction rub. No gallop.   Pulmonary:      Effort: Pulmonary effort is normal. No respiratory distress.      Breath sounds: Normal breath sounds. No wheezing, rhonchi or rales.   Abdominal:      General: Bowel sounds  are normal. There is no distension.      Palpations: Abdomen is soft.      Tenderness: There is no abdominal tenderness.   Musculoskeletal:         General: No swelling or tenderness. Normal range of motion.      Cervical back: Normal range of motion and neck supple. No muscular tenderness.      Right lower leg: No edema.      Left lower leg: No edema.   Skin:     General: Skin is warm and dry.      Capillary Refill: Capillary refill takes less than 2 seconds.      Findings: No bruising, erythema or rash.   Neurological:      General: No focal deficit present.      Mental Status: He is alert and oriented to person, place, and time.      Cranial Nerves: Dysarthria present.      Motor: Weakness and atrophy present.      Gait: Gait abnormal.      Comments: Bradykinesia.  Cogwheel rigidity          Fluids    Intake/Output Summary (Last 24 hours) at 12/28/2024 1325  Last data filed at 12/28/2024 0900  Gross per 24 hour   Intake 1020 ml   Output 4025 ml   Net -3005 ml        Laboratory  Recent Labs     12/26/24  0030 12/27/24  0304   WBC 7.8 5.4   RBC 3.54* 4.11*   HEMOGLOBIN 11.6* 13.2*   HEMATOCRIT 34.4* 40.3*   MCV 97.2 98.1*   MCH 32.8 32.1   MCHC 33.7 32.8   RDW 45.9 47.1   PLATELETCT 210 219   MPV 9.8 10.5     Recent Labs     12/26/24  0030 12/27/24  0304   SODIUM 139 142   POTASSIUM 3.7 3.9   CHLORIDE 104 108   CO2 22 24   GLUCOSE 114* 103*   BUN 14 10   CREATININE 0.77 0.82   CALCIUM 8.3* 8.9                   Imaging  [id number] VIEW   Final Result      Moderate constipation. No evidence of bowel obstruction.           Assessment/Plan  * Ataxia and dysarthria- (present on admission)  Assessment & Plan  He has been falling and having balance issues chronically, reportedly worsening.  Additionally, he developed dysarthria.  On exam he does have some signs of parkinsonian syndrome such as cogwheel rigidity, but not tremor.  The patient might have been drinking alcohol and may have cerebellar atrophy secondary to  it.     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  He has an appointment with neurology on January 30.  ESR 27, CRP 2.06, HIV negative.  TSH, B12 within normal limits.     Neurology consulted, they do not recommend initiation of Sinemet while inpatient.  Due to concern for possible atypical PD, at this time do not recommend initiation of Sinemet, as if confirmed atypical PD, can actually worsen his symptoms.  Patient to follow-up with neurology at his scheduled appointment on 1/30.  PT/OT    Adult failure to thrive- (present on admission)  Assessment & Plan  Pt/ot     placement    Constipation- (present on admission)  Assessment & Plan  Resolved  Continue with bowel regimen    Left retinal detachment  Assessment & Plan  MRI imaging also revealed left retinal detachment.  Patient was made aware and reports that he is already seen an ophthalmologist for this.  Patient now with left eye blindness.    Elevated SGOT (AST)- (present on admission)  Assessment & Plan  resolved         VTE prophylaxis: Lovenox    I have performed a physical exam and reviewed and updated ROS and Plan today (12/28/2024). In review of yesterday's note (12/27/2024), there are no changes except as documented above.

## 2024-12-28 NOTE — CARE PLAN
The patient is Stable - Low risk of patient condition declining or worsening    Shift Goals  Clinical Goals: Paient will remain free from fall or injuries throughout shift  Patient Goals: Sleep and rest  Family Goals: ALTON    Progress made toward(s) clinical / shift goals:    Patient is alert and oriented x4, able to communicate needs and calls appropriately. Patient denies pain or discomfort. Patient remain free from fall or injuries throughout shift. Patient's bed alarm is on, bed in low position, hourly rounding done, call light within reach.    Problem: Mobility  Goal: Patient's capacity to carry out activities will improve  Description: Target End Date:  Prior to discharge or change in level of care    Outcome: Progressing     Problem: Fall Risk  Goal: Patient will remain free from falls  Description: Target End Date:  Prior to discharge or change in level of care    Outcome: Progressing     Problem: Knowledge Deficit - Standard  Goal: Patient and family/care givers will demonstrate understanding of plan of care, disease process/condition, diagnostic tests and medications  Description: Target End Date:  1-3 days or as soon as patient condition allows    Outcome: Progressing

## 2024-12-28 NOTE — CARE PLAN
The patient is Stable - Low risk of patient condition declining or worsening    Shift Goals  Clinical Goals: Paient will remain safe and free from falls  Patient Goals: Sleep and rest  Family Goals: ALTON    Patient a/o x 4. Patient had a large bowel movement and a shower. Spoke with patient's wife who said she cares for her  but is on 6 liters of oxygen at home. Let her know that case-management is still working out the discharge plan. Patient is hard to understand with his speech at times. Patient denies pain. Call light and belongings are within reach. Bed is in low locked position. Call light is on.     Progress made toward(s) clinical / shift goals:    Problem: Knowledge Deficit - Standard  Goal: Patient and family/care givers will demonstrate understanding of plan of care, disease process/condition, diagnostic tests and medications  Outcome: Not Progressing     Problem: Fall Risk  Goal: Patient will remain free from falls  Outcome: Not Progressing     Problem: Pain - Standard  Goal: Alleviation of pain or a reduction in pain to the patient’s comfort goal  Outcome: Progressing     Problem: Mobility  Goal: Patient's capacity to carry out activities will improve  Outcome: Progressing     Problem: Skin Integrity  Goal: Skin integrity is maintained or improved  Outcome: Progressing       Patient is not progressing towards the following goals:      Problem: Knowledge Deficit - Standard  Goal: Patient and family/care givers will demonstrate understanding of plan of care, disease process/condition, diagnostic tests and medications  Outcome: Not Progressing     Problem: Fall Risk  Goal: Patient will remain free from falls  Outcome: Not Progressing

## 2024-12-29 VITALS
HEIGHT: 73 IN | SYSTOLIC BLOOD PRESSURE: 110 MMHG | TEMPERATURE: 97.1 F | HEART RATE: 75 BPM | BODY MASS INDEX: 20.69 KG/M2 | RESPIRATION RATE: 16 BRPM | WEIGHT: 156.09 LBS | OXYGEN SATURATION: 94 % | DIASTOLIC BLOOD PRESSURE: 65 MMHG

## 2024-12-29 LAB
MYELOPEROXIDASE AB SER-ACNC: 0 AU/ML (ref 0–19)
NUCLEAR IGG SER QL IA: NORMAL
PROTEINASE3 AB SER-ACNC: 0 AU/ML (ref 0–19)

## 2024-12-29 PROCEDURE — G0378 HOSPITAL OBSERVATION PER HR: HCPCS

## 2024-12-29 PROCEDURE — 99239 HOSP IP/OBS DSCHRG MGMT >30: CPT | Performed by: GENERAL PRACTICE

## 2024-12-29 RX ORDER — POLYETHYLENE GLYCOL 3350 17 G/17G
17 POWDER, FOR SOLUTION ORAL DAILY
Qty: 1020 G | Refills: 0 | Status: SHIPPED | OUTPATIENT
Start: 2024-12-29 | End: 2025-02-27

## 2024-12-29 RX ORDER — AMOXICILLIN 250 MG
2 CAPSULE ORAL 2 TIMES DAILY
Qty: 240 TABLET | Refills: 0 | Status: SHIPPED | OUTPATIENT
Start: 2024-12-29 | End: 2025-02-27

## 2024-12-29 ASSESSMENT — PAIN DESCRIPTION - PAIN TYPE: TYPE: ACUTE PAIN

## 2024-12-29 NOTE — PROGRESS NOTES
Patient discharged home with friend Bryan. PIV removed. Discharge instructions reviewed. Commode to be delivered to patients home. Wife updated.

## 2024-12-29 NOTE — CARE PLAN
The patient is Stable - Low risk of patient condition declining or worsening    Shift Goals  Clinical Goals: Patient will remain free from fall or injuries throughout shift  Patient Goals: Sleep and rest  Family Goals: ALTON    Progress made toward(s) clinical / shift goals:    Patient is alert and oriented x4, able to communicate needs and calls appropriately. Patient denies any pain or discomfort. Patient's bed alarm is on, bed in low position, hourly rounding done, call light within reach.      Problem: Skin Integrity  Goal: Skin integrity is maintained or improved  Description: Target End Date:  Prior to discharge or change in level of care    Outcome: Progressing     Problem: Mobility  Goal: Patient's capacity to carry out activities will improve  Description: Target End Date:  Prior to discharge or change in level of care    Outcome: Progressing     Problem: Fall Risk  Goal: Patient will remain free from falls  Description: Target End Date:  Prior to discharge or change in level of care      Outcome: Progressing     Problem: Knowledge Deficit - Standard  Goal: Patient and family/care givers will demonstrate understanding of plan of care, disease process/condition, diagnostic tests and medications  Description: Target End Date:  1-3 days or as soon as patient condition allows    Outcome: Progressing

## 2024-12-29 NOTE — DISCHARGE SUMMARY
Discharge Summary    CHIEF COMPLAINT ON ADMISSION  Chief Complaint   Patient presents with    Constipation     Last BM x5 days ago, has not taken OTC constipation meds, lower abd pain 8/10, can't ambulate d/t weakness, able to urinate, states not diagnosed with any GI issues but constipation has happened before x2 weeks ago       Reason for Admission  Abd Pain     Admission Date  12/25/2024    CODE STATUS  Full Code    HPI & HOSPITAL COURSE  This is a 64-year-old male with past medical history of constipation who presented to the ED on 12/25 history 24 with weakness, balance problems, recurrent falls, ataxia and dysarthria.    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  He has an appointment with neurology on January 30.  ESR 27, CRP 2.06, HIV negative.  TSH, B12 within normal limits.  Neurology consulted, they do not recommend initiation of Sinemet while inpatient.  Due to concern for possible atypical PD, at this time do not recommend initiation of Sinemet, as if confirmed atypical PD, can actually worsen his symptoms.  Patient to follow-up with neurology at his scheduled appointment on 1/30.  Discussed with patient, patient's wife, they will have their friend assist them at home, patient has a wheelchair, we ordered a bedside commode, and friend will be able to transfer him to his appointment on January 30.  I have asked the wife to call neurology office to see if his appointment can be bumped up given that he was just recently hospitalized.    MRI imaging also revealed left retinal detachment.  Patient was made aware and reports that he is already seen an ophthalmologist for this.  Patient now with left eye blindness.    Therefore, he is discharged in good and stable condition to home with close outpatient follow-up.    The patient met 2-midnight criteria for an inpatient stay at the  time of discharge.    Discharge Date  12/29/2024    FOLLOW UP ITEMS POST DISCHARGE  Primary care physician  Neurology    DISCHARGE DIAGNOSES  Principal Problem:    Ataxia and dysarthria (POA: Yes)  Active Problems:    Constipation (POA: Yes)    Adult failure to thrive (POA: Yes)    Elevated SGOT (AST) (POA: Yes)    Severe protein-calorie malnutrition (HCC) (POA: Unknown)    Left retinal detachment (POA: Unknown)  Resolved Problems:    * No resolved hospital problems. *      FOLLOW UP  Future Appointments   Date Time Provider Department Center   1/30/2025 11:00 AM Sunny Rangel M.D. [facility] None     Sunny Rangel M.D.  [address]  [address]  [facility] [id number] [phone]    Follow up on 1/30/2025        MEDICATIONS ON DISCHARGE     Medication List        START taking these medications        Instructions   polyethylene glycol 3350 17 GM/SCOOP Powd  Commonly known as: Miralax   Take 17 g by mouth every day for 60 days.  Dose: 17 g     senna-docusate 8.6-50 MG Tabs  Commonly known as: Pericolace Or Senokot S   Take 2 Tablets by mouth 2 times a day for 60 days.  Dose: 2 Tablet              Allergies  No Known Allergies    DIET  Orders Placed This Encounter   Procedures    Diet Order Diet: Regular     Standing Status:   Standing     Number of Occurrences:   1     Order Specific Question:   Diet:     Answer:   Regular [1]       ACTIVITY  As tolerated.  Weight bearing as tolerated    CONSULTATIONS  Neurology    PROCEDURES  None    LABORATORY  Lab Results   Component Value Date    SODIUM 142 12/27/2024    POTASSIUM 3.9 12/27/2024    CHLORIDE 108 12/27/2024    CO2 24 12/27/2024    GLUCOSE 103 (H) 12/27/2024    BUN 10 12/27/2024    CREATININE 0.82 12/27/2024        Lab Results   Component Value Date    WBC 5.4 12/27/2024    HEMOGLOBIN 13.2 (L) 12/27/2024    HEMATOCRIT 40.3 (L) 12/27/2024    PLATELETCT 219 12/27/2024      [id number] VIEW   Final Result      Moderate constipation. No evidence of bowel obstruction.          Total time of the discharge process exceeds 45 minutes.

## 2024-12-29 NOTE — PROGRESS NOTES
4 Eyes Skin Assessment Completed by Cyndi RN and SLAVA Masters.    Head WDL  Ears WDL  Nose WDL  Mouth WDL  Neck WDL  Breast/Chest WDL  Shoulder Blades WDL  Spine WDL  (R) Arm/Elbow/Hand Bruising and Scab  (L) Arm/Elbow/Hand Bruising and Scab  Abdomen WDL  Groin Redness and Blanching  Scrotum/Coccyx/Buttocks Redness and Blanching  (R) Leg Scab  (L) Leg Scab  (R) Heel/Foot/Toe Redness, Blanching, and Boggy  (L) Heel/Foot/Toe Redness, Blanching, and Boggy          Devices In Places Blood Pressure Cuff      Interventions In Place Heel Mepilex and Barrier Cream    Possible Skin Injury No    Pictures Uploaded Into Epic Yes  Wound Consult Placed N/A  RN Wound Prevention Protocol Ordered Yes

## 2025-01-14 ENCOUNTER — TELEPHONE (OUTPATIENT)
Dept: NEUROLOGY | Facility: MEDICAL CENTER | Age: 65
End: 2025-01-14
Payer: COMMERCIAL

## 2025-01-14 NOTE — TELEPHONE ENCOUNTER
NEUROLOGY PATIENT PRE-VISIT PLANNING     Patient was NOT contacted to complete PVP.  Note: Patient will not be contacted if there is no indication to call.     Patient Appointment is scheduled as: New Patient     Is visit type and length scheduled correctly? Yes    [employer] Patient is checked in Patient Demographics? Yes    3.   Is referral attached to visit? Yes    4. Were records received from referring provider? Yes    4. Patient was NOT contacted to have someone accompany them to visit.     5. Is this appointment scheduled as a Hospital Follow-Up?  No    6. Does the patient require any pre procedure or post procedure follow up? No    7. If any orders were placed at last visit or intended to be done for this visit do we have Results/Consult Notes? No  Labs - Labs were not ordered at last office visit.  Imaging - Imaging was not ordered at last office visit.  Referrals - No referrals were ordered at last office visit.  New Patient.     8. If patient appointment is for Botox - is order pended for provider? N/A    9. Was Plan Assessment from last Neurology Office Visit Reviewed?  Yes

## 2025-01-29 ENCOUNTER — TELEPHONE (OUTPATIENT)
Dept: HEALTH INFORMATION MANAGEMENT | Facility: OTHER | Age: 65
End: 2025-01-29
Payer: COMMERCIAL

## 2025-01-30 ENCOUNTER — APPOINTMENT (OUTPATIENT)
Dept: NEUROLOGY | Facility: MEDICAL CENTER | Age: 65
End: 2025-01-30
Attending: PSYCHIATRY & NEUROLOGY
Payer: COMMERCIAL